# Patient Record
Sex: MALE | Race: WHITE | NOT HISPANIC OR LATINO | URBAN - METROPOLITAN AREA
[De-identification: names, ages, dates, MRNs, and addresses within clinical notes are randomized per-mention and may not be internally consistent; named-entity substitution may affect disease eponyms.]

---

## 2018-05-16 ENCOUNTER — OUTPATIENT (OUTPATIENT)
Dept: OUTPATIENT SERVICES | Facility: HOSPITAL | Age: 63
LOS: 1 days | Discharge: HOME | End: 2018-05-16
Payer: MEDICARE

## 2018-05-16 VITALS
OXYGEN SATURATION: 98 % | HEIGHT: 68 IN | RESPIRATION RATE: 17 BRPM | SYSTOLIC BLOOD PRESSURE: 165 MMHG | TEMPERATURE: 98 F | WEIGHT: 169.54 LBS | HEART RATE: 83 BPM | DIASTOLIC BLOOD PRESSURE: 74 MMHG

## 2018-05-16 DIAGNOSIS — Z01.818 ENCOUNTER FOR OTHER PREPROCEDURAL EXAMINATION: ICD-10-CM

## 2018-05-16 DIAGNOSIS — E78.5 HYPERLIPIDEMIA, UNSPECIFIED: ICD-10-CM

## 2018-05-16 DIAGNOSIS — M17.0 BILATERAL PRIMARY OSTEOARTHRITIS OF KNEE: ICD-10-CM

## 2018-05-16 DIAGNOSIS — I10 ESSENTIAL (PRIMARY) HYPERTENSION: ICD-10-CM

## 2018-05-16 DIAGNOSIS — Z90.49 ACQUIRED ABSENCE OF OTHER SPECIFIED PARTS OF DIGESTIVE TRACT: Chronic | ICD-10-CM

## 2018-05-16 DIAGNOSIS — Z98.890 OTHER SPECIFIED POSTPROCEDURAL STATES: Chronic | ICD-10-CM

## 2018-05-16 DIAGNOSIS — M25.569 PAIN IN UNSPECIFIED KNEE: ICD-10-CM

## 2018-05-16 LAB
ANION GAP SERPL CALC-SCNC: 8 MMOL/L — SIGNIFICANT CHANGE UP (ref 5–17)
APTT BLD: 32 SEC — SIGNIFICANT CHANGE UP (ref 27.5–37.4)
BASOPHILS # BLD AUTO: 0.03 K/UL — SIGNIFICANT CHANGE UP (ref 0–0.2)
BASOPHILS NFR BLD AUTO: 0.5 % — SIGNIFICANT CHANGE UP (ref 0–2)
BUN SERPL-MCNC: 18 MG/DL — SIGNIFICANT CHANGE UP (ref 7–23)
CALCIUM SERPL-MCNC: 9.6 MG/DL — SIGNIFICANT CHANGE UP (ref 8.5–10.1)
CHLORIDE SERPL-SCNC: 105 MMOL/L — SIGNIFICANT CHANGE UP (ref 96–108)
CO2 SERPL-SCNC: 28 MMOL/L — SIGNIFICANT CHANGE UP (ref 22–31)
CREAT SERPL-MCNC: 1.19 MG/DL — SIGNIFICANT CHANGE UP (ref 0.5–1.3)
EOSINOPHIL # BLD AUTO: 0.48 K/UL — SIGNIFICANT CHANGE UP (ref 0–0.5)
EOSINOPHIL NFR BLD AUTO: 8.4 % — HIGH (ref 0–6)
GLUCOSE SERPL-MCNC: 142 MG/DL — HIGH (ref 70–99)
HBA1C BLD-MCNC: 5.7 % — HIGH (ref 4–5.6)
HCT VFR BLD CALC: 43.2 % — SIGNIFICANT CHANGE UP (ref 39–50)
HGB BLD-MCNC: 15.3 G/DL — SIGNIFICANT CHANGE UP (ref 13–17)
IMM GRANULOCYTES NFR BLD AUTO: 0.5 % — SIGNIFICANT CHANGE UP (ref 0–1.5)
INR BLD: 1.08 RATIO — SIGNIFICANT CHANGE UP (ref 0.88–1.16)
LYMPHOCYTES # BLD AUTO: 1.5 K/UL — SIGNIFICANT CHANGE UP (ref 1–3.3)
LYMPHOCYTES # BLD AUTO: 26.2 % — SIGNIFICANT CHANGE UP (ref 13–44)
MCHC RBC-ENTMCNC: 30.1 PG — SIGNIFICANT CHANGE UP (ref 27–34)
MCHC RBC-ENTMCNC: 35.4 GM/DL — SIGNIFICANT CHANGE UP (ref 32–36)
MCV RBC AUTO: 85 FL — SIGNIFICANT CHANGE UP (ref 80–100)
MONOCYTES # BLD AUTO: 0.58 K/UL — SIGNIFICANT CHANGE UP (ref 0–0.9)
MONOCYTES NFR BLD AUTO: 10.1 % — SIGNIFICANT CHANGE UP (ref 2–14)
MRSA PCR RESULT.: SIGNIFICANT CHANGE UP
NEUTROPHILS # BLD AUTO: 3.1 K/UL — SIGNIFICANT CHANGE UP (ref 1.8–7.4)
NEUTROPHILS NFR BLD AUTO: 54.3 % — SIGNIFICANT CHANGE UP (ref 43–77)
PLATELET # BLD AUTO: 193 K/UL — SIGNIFICANT CHANGE UP (ref 150–400)
POTASSIUM SERPL-MCNC: 3.6 MMOL/L — SIGNIFICANT CHANGE UP (ref 3.5–5.3)
POTASSIUM SERPL-SCNC: 3.6 MMOL/L — SIGNIFICANT CHANGE UP (ref 3.5–5.3)
PROTHROM AB SERPL-ACNC: 11.8 SEC — SIGNIFICANT CHANGE UP (ref 9.8–12.7)
RBC # BLD: 5.08 M/UL — SIGNIFICANT CHANGE UP (ref 4.2–5.8)
RBC # FLD: 13.4 % — SIGNIFICANT CHANGE UP (ref 10.3–14.5)
S AUREUS DNA NOSE QL NAA+PROBE: SIGNIFICANT CHANGE UP
SODIUM SERPL-SCNC: 141 MMOL/L — SIGNIFICANT CHANGE UP (ref 135–145)
WBC # BLD: 5.72 K/UL — SIGNIFICANT CHANGE UP (ref 3.8–10.5)
WBC # FLD AUTO: 5.72 K/UL — SIGNIFICANT CHANGE UP (ref 3.8–10.5)

## 2018-05-16 PROCEDURE — 93010 ELECTROCARDIOGRAM REPORT: CPT | Mod: NC

## 2018-05-16 NOTE — PATIENT PROFILE ADULT. - PSH
History of liver biopsy    S/P colon resection  secondary to perforated diverticuli- hatman procedure and reversal

## 2018-05-16 NOTE — PHYSICAL THERAPY INITIAL EVALUATION ADULT - MODIFIED CLINICAL TEST OF SENSORY INTEGRATION IN BALANCE TEST
5x sit to stand = 16.46 seconds. 2MWT = 280ft without device. Stairs: step-over-step with R rail for ascent. Pt states with excessive pain he performs stair negotiation with a step-to-step pattern.

## 2018-05-16 NOTE — PATIENT PROFILE ADULT. - ABILITY TO HEAR (WITH HEARING AID OR HEARING APPLIANCE IF NORMALLY USED):
Bilateral Hearing Aids/Mildly to Moderately Impaired: difficulty hearing in some environments or speaker may need to increase volume or speak distinctly

## 2018-05-16 NOTE — OCCUPATIONAL THERAPY INITIAL EVALUATION ADULT - TRANSFER SAFETY CONCERNS NOTED: SIT/STAND, REHAB EVAL
decreased step length/decreased weight-shifting ability/decreased sequencing ability/decreased balance during turns

## 2018-05-16 NOTE — H&P PST ADULT - NSANTHOSAYNRD_GEN_A_CORE
No. WOJCIECH screening performed.  STOP BANG Legend: 0-2 = LOW Risk; 3-4 = INTERMEDIATE Risk; 5-8 = HIGH Risk

## 2018-05-16 NOTE — H&P PST ADULT - PSH
History of liver biopsy    S/P colon resection  secondary to perforated diverticuli- hatman procedure and reversal History of liver biopsy    S/P colon resection  secondary to perforated diverticuli- sanchez procedure and reversal

## 2018-05-16 NOTE — H&P PST ADULT - PMH
Fatty liver    HTN (hypertension)    Hyperlipidemia    Prostate CA  sees urologist  Pulmonary emboli  1996 post pelvic fracture

## 2018-05-16 NOTE — PHYSICAL THERAPY INITIAL EVALUATION ADULT - ADDITIONAL COMMENTS
Pt lives with his wife (whom can provide assistance after surgery) in a private home, 9 entry steps (B/L rails, close enough to reach), flight to 2nd level (6 steps with rail, landing, 6 additional steps with rail). Pt is independently with all functional mobility (including community ambulation without a device) & ADL's. Pt has a tub/shower combo, wears eye glasses for reading & distance, has hearing aid in R ear, is right hand dominant, drives, & is retired. Pt states he just recently participated in a triathalon & would like to compete again. In addition, pt states he has a trip booked to [place] 3 weeks post-op & he would like to be able to go. Pt scheduled to have R TKA on 7/11. Pt states he has 1/10 knee pain at rest & it increases with activity. Pt takes Aleve PRN. Pt has no DMEs at home.

## 2018-05-16 NOTE — OCCUPATIONAL THERAPY INITIAL EVALUATION ADULT - ADDITIONAL COMMENTS
Patient lives in a private house with 9 steps to enter with bilateral handrails. Once inside, the patient has  12 steps with a rail on one side to reach main floor where bedroom and bathroom is. The patients bathroom has a tub/shower combination with a regular toilet. The patient reports not having any DME inside of the bathroom. The patient ambulates with no device and does not own any devices for ambulation.

## 2018-05-29 ENCOUNTER — TRANSCRIPTION ENCOUNTER (OUTPATIENT)
Age: 63
End: 2018-05-29

## 2018-05-29 RX ORDER — SODIUM CHLORIDE 9 MG/ML
3 INJECTION INTRAMUSCULAR; INTRAVENOUS; SUBCUTANEOUS EVERY 8 HOURS
Qty: 0 | Refills: 0 | Status: DISCONTINUED | OUTPATIENT
Start: 2018-05-30 | End: 2018-05-31

## 2018-05-30 ENCOUNTER — RESULT REVIEW (OUTPATIENT)
Age: 63
End: 2018-05-30

## 2018-05-30 ENCOUNTER — TRANSCRIPTION ENCOUNTER (OUTPATIENT)
Age: 63
End: 2018-05-30

## 2018-05-30 ENCOUNTER — INPATIENT (INPATIENT)
Facility: HOSPITAL | Age: 63
LOS: 0 days | Discharge: HOME HEALTH | End: 2018-05-31
Attending: ORTHOPAEDIC SURGERY | Admitting: ORTHOPAEDIC SURGERY
Payer: MEDICARE

## 2018-05-30 VITALS
RESPIRATION RATE: 16 BRPM | DIASTOLIC BLOOD PRESSURE: 69 MMHG | OXYGEN SATURATION: 97 % | SYSTOLIC BLOOD PRESSURE: 150 MMHG | TEMPERATURE: 98 F | HEIGHT: 68 IN | HEART RATE: 74 BPM | WEIGHT: 175.93 LBS

## 2018-05-30 DIAGNOSIS — R73.03 PREDIABETES: ICD-10-CM

## 2018-05-30 DIAGNOSIS — E78.2 MIXED HYPERLIPIDEMIA: ICD-10-CM

## 2018-05-30 DIAGNOSIS — Z90.49 ACQUIRED ABSENCE OF OTHER SPECIFIED PARTS OF DIGESTIVE TRACT: Chronic | ICD-10-CM

## 2018-05-30 DIAGNOSIS — Z98.890 OTHER SPECIFIED POSTPROCEDURAL STATES: Chronic | ICD-10-CM

## 2018-05-30 DIAGNOSIS — C61 MALIGNANT NEOPLASM OF PROSTATE: ICD-10-CM

## 2018-05-30 DIAGNOSIS — K21.0 GASTRO-ESOPHAGEAL REFLUX DISEASE WITH ESOPHAGITIS: ICD-10-CM

## 2018-05-30 DIAGNOSIS — M17.12 UNILATERAL PRIMARY OSTEOARTHRITIS, LEFT KNEE: ICD-10-CM

## 2018-05-30 DIAGNOSIS — I10 ESSENTIAL (PRIMARY) HYPERTENSION: ICD-10-CM

## 2018-05-30 LAB
HCT VFR BLD CALC: 37.6 % — LOW (ref 39–50)
HGB BLD-MCNC: 12.9 G/DL — LOW (ref 13–17)
MCHC RBC-ENTMCNC: 29.7 PG — SIGNIFICANT CHANGE UP (ref 27–34)
MCHC RBC-ENTMCNC: 34.3 GM/DL — SIGNIFICANT CHANGE UP (ref 32–36)
MCV RBC AUTO: 86.6 FL — SIGNIFICANT CHANGE UP (ref 80–100)
NRBC # BLD: 0 /100 WBCS — SIGNIFICANT CHANGE UP (ref 0–0)
PLATELET # BLD AUTO: 155 K/UL — SIGNIFICANT CHANGE UP (ref 150–400)
RBC # BLD: 4.34 M/UL — SIGNIFICANT CHANGE UP (ref 4.2–5.8)
RBC # FLD: 13.4 % — SIGNIFICANT CHANGE UP (ref 10.3–14.5)
WBC # BLD: 5.05 K/UL — SIGNIFICANT CHANGE UP (ref 3.8–10.5)
WBC # FLD AUTO: 5.05 K/UL — SIGNIFICANT CHANGE UP (ref 3.8–10.5)

## 2018-05-30 PROCEDURE — 88311 DECALCIFY TISSUE: CPT | Mod: 26

## 2018-05-30 PROCEDURE — 88305 TISSUE EXAM BY PATHOLOGIST: CPT | Mod: 26

## 2018-05-30 PROCEDURE — 99223 1ST HOSP IP/OBS HIGH 75: CPT

## 2018-05-30 PROCEDURE — 73560 X-RAY EXAM OF KNEE 1 OR 2: CPT | Mod: 26,LT

## 2018-05-30 RX ORDER — RIVAROXABAN 15 MG-20MG
10 KIT ORAL DAILY
Qty: 0 | Refills: 0 | Status: DISCONTINUED | OUTPATIENT
Start: 2018-05-31 | End: 2018-05-31

## 2018-05-30 RX ORDER — SODIUM CHLORIDE 9 MG/ML
1000 INJECTION INTRAMUSCULAR; INTRAVENOUS; SUBCUTANEOUS
Qty: 0 | Refills: 0 | Status: DISCONTINUED | OUTPATIENT
Start: 2018-05-30 | End: 2018-05-31

## 2018-05-30 RX ORDER — FOLIC ACID 0.8 MG
1 TABLET ORAL DAILY
Qty: 0 | Refills: 0 | Status: DISCONTINUED | OUTPATIENT
Start: 2018-05-30 | End: 2018-05-31

## 2018-05-30 RX ORDER — POLYETHYLENE GLYCOL 3350 17 G/17G
17 POWDER, FOR SOLUTION ORAL DAILY
Qty: 0 | Refills: 0 | Status: DISCONTINUED | OUTPATIENT
Start: 2018-05-30 | End: 2018-05-31

## 2018-05-30 RX ORDER — OXYCODONE HYDROCHLORIDE 5 MG/1
5 TABLET ORAL EVERY 4 HOURS
Qty: 0 | Refills: 0 | Status: DISCONTINUED | OUTPATIENT
Start: 2018-05-30 | End: 2018-05-31

## 2018-05-30 RX ORDER — ONDANSETRON 8 MG/1
4 TABLET, FILM COATED ORAL EVERY 6 HOURS
Qty: 0 | Refills: 0 | Status: DISCONTINUED | OUTPATIENT
Start: 2018-05-30 | End: 2018-05-31

## 2018-05-30 RX ORDER — CELECOXIB 200 MG/1
200 CAPSULE ORAL EVERY 12 HOURS
Qty: 0 | Refills: 0 | Status: DISCONTINUED | OUTPATIENT
Start: 2018-05-31 | End: 2018-05-31

## 2018-05-30 RX ORDER — MAGNESIUM HYDROXIDE 400 MG/1
30 TABLET, CHEWABLE ORAL DAILY
Qty: 0 | Refills: 0 | Status: DISCONTINUED | OUTPATIENT
Start: 2018-05-30 | End: 2018-05-31

## 2018-05-30 RX ORDER — HYDROMORPHONE HYDROCHLORIDE 2 MG/ML
1 INJECTION INTRAMUSCULAR; INTRAVENOUS; SUBCUTANEOUS
Qty: 0 | Refills: 0 | Status: DISCONTINUED | OUTPATIENT
Start: 2018-05-30 | End: 2018-05-30

## 2018-05-30 RX ORDER — HYDROCHLOROTHIAZIDE 25 MG
12.5 TABLET ORAL DAILY
Qty: 0 | Refills: 0 | Status: DISCONTINUED | OUTPATIENT
Start: 2018-05-30 | End: 2018-05-31

## 2018-05-30 RX ORDER — SENNA PLUS 8.6 MG/1
2 TABLET ORAL AT BEDTIME
Qty: 0 | Refills: 0 | Status: DISCONTINUED | OUTPATIENT
Start: 2018-05-30 | End: 2018-05-31

## 2018-05-30 RX ORDER — CELECOXIB 200 MG/1
200 CAPSULE ORAL ONCE
Qty: 0 | Refills: 0 | Status: COMPLETED | OUTPATIENT
Start: 2018-05-30 | End: 2018-05-30

## 2018-05-30 RX ORDER — DEXAMETHASONE 0.5 MG/5ML
10 ELIXIR ORAL ONCE
Qty: 0 | Refills: 0 | Status: COMPLETED | OUTPATIENT
Start: 2018-05-31 | End: 2018-05-31

## 2018-05-30 RX ORDER — OXYCODONE HYDROCHLORIDE 5 MG/1
10 TABLET ORAL EVERY 4 HOURS
Qty: 0 | Refills: 0 | Status: DISCONTINUED | OUTPATIENT
Start: 2018-05-30 | End: 2018-05-31

## 2018-05-30 RX ORDER — ACETAMINOPHEN 500 MG
650 TABLET ORAL ONCE
Qty: 0 | Refills: 0 | Status: COMPLETED | OUTPATIENT
Start: 2018-05-30 | End: 2018-05-30

## 2018-05-30 RX ORDER — OXYCODONE HYDROCHLORIDE 5 MG/1
20 TABLET ORAL ONCE
Qty: 0 | Refills: 0 | Status: DISCONTINUED | OUTPATIENT
Start: 2018-05-30 | End: 2018-05-30

## 2018-05-30 RX ORDER — ASCORBIC ACID 60 MG
500 TABLET,CHEWABLE ORAL
Qty: 0 | Refills: 0 | Status: DISCONTINUED | OUTPATIENT
Start: 2018-05-30 | End: 2018-05-31

## 2018-05-30 RX ORDER — FENTANYL CITRATE 50 UG/ML
50 INJECTION INTRAVENOUS
Qty: 0 | Refills: 0 | Status: DISCONTINUED | OUTPATIENT
Start: 2018-05-30 | End: 2018-05-30

## 2018-05-30 RX ORDER — DOCUSATE SODIUM 100 MG
100 CAPSULE ORAL THREE TIMES A DAY
Qty: 0 | Refills: 0 | Status: DISCONTINUED | OUTPATIENT
Start: 2018-05-30 | End: 2018-05-31

## 2018-05-30 RX ORDER — GEMFIBROZIL 600 MG
600 TABLET ORAL
Qty: 0 | Refills: 0 | Status: DISCONTINUED | OUTPATIENT
Start: 2018-05-30 | End: 2018-05-31

## 2018-05-30 RX ORDER — TRANEXAMIC ACID 100 MG/ML
1000 INJECTION, SOLUTION INTRAVENOUS ONCE
Qty: 0 | Refills: 0 | Status: COMPLETED | OUTPATIENT
Start: 2018-05-30 | End: 2018-05-30

## 2018-05-30 RX ORDER — CEFAZOLIN SODIUM 1 G
2000 VIAL (EA) INJECTION EVERY 8 HOURS
Qty: 0 | Refills: 0 | Status: COMPLETED | OUTPATIENT
Start: 2018-05-30 | End: 2018-05-31

## 2018-05-30 RX ORDER — PANTOPRAZOLE SODIUM 20 MG/1
40 TABLET, DELAYED RELEASE ORAL DAILY
Qty: 0 | Refills: 0 | Status: DISCONTINUED | OUTPATIENT
Start: 2018-05-30 | End: 2018-05-31

## 2018-05-30 RX ORDER — HYDROMORPHONE HYDROCHLORIDE 2 MG/ML
1 INJECTION INTRAMUSCULAR; INTRAVENOUS; SUBCUTANEOUS EVERY 8 HOURS
Qty: 0 | Refills: 0 | Status: DISCONTINUED | OUTPATIENT
Start: 2018-05-30 | End: 2018-05-31

## 2018-05-30 RX ORDER — SODIUM CHLORIDE 9 MG/ML
1000 INJECTION, SOLUTION INTRAVENOUS
Qty: 0 | Refills: 0 | Status: DISCONTINUED | OUTPATIENT
Start: 2018-05-30 | End: 2018-05-30

## 2018-05-30 RX ORDER — ACETAMINOPHEN 500 MG
975 TABLET ORAL EVERY 8 HOURS
Qty: 0 | Refills: 0 | Status: DISCONTINUED | OUTPATIENT
Start: 2018-05-30 | End: 2018-05-31

## 2018-05-30 RX ADMIN — SODIUM CHLORIDE 3 MILLILITER(S): 9 INJECTION INTRAMUSCULAR; INTRAVENOUS; SUBCUTANEOUS at 21:31

## 2018-05-30 RX ADMIN — OXYCODONE HYDROCHLORIDE 10 MILLIGRAM(S): 5 TABLET ORAL at 20:51

## 2018-05-30 RX ADMIN — OXYCODONE HYDROCHLORIDE 5 MILLIGRAM(S): 5 TABLET ORAL at 14:38

## 2018-05-30 RX ADMIN — Medication 100 MILLIGRAM(S): at 14:38

## 2018-05-30 RX ADMIN — Medication 100 MILLIGRAM(S): at 16:21

## 2018-05-30 RX ADMIN — TRANEXAMIC ACID 220 MILLIGRAM(S): 100 INJECTION, SOLUTION INTRAVENOUS at 11:24

## 2018-05-30 RX ADMIN — OXYCODONE HYDROCHLORIDE 5 MILLIGRAM(S): 5 TABLET ORAL at 18:21

## 2018-05-30 RX ADMIN — OXYCODONE HYDROCHLORIDE 20 MILLIGRAM(S): 5 TABLET ORAL at 07:31

## 2018-05-30 RX ADMIN — Medication 650 MILLIGRAM(S): at 07:31

## 2018-05-30 RX ADMIN — OXYCODONE HYDROCHLORIDE 5 MILLIGRAM(S): 5 TABLET ORAL at 15:38

## 2018-05-30 RX ADMIN — OXYCODONE HYDROCHLORIDE 10 MILLIGRAM(S): 5 TABLET ORAL at 21:51

## 2018-05-30 RX ADMIN — SODIUM CHLORIDE 3 MILLILITER(S): 9 INJECTION INTRAMUSCULAR; INTRAVENOUS; SUBCUTANEOUS at 14:35

## 2018-05-30 RX ADMIN — SODIUM CHLORIDE 100 MILLILITER(S): 9 INJECTION, SOLUTION INTRAVENOUS at 11:24

## 2018-05-30 RX ADMIN — Medication 500 MILLIGRAM(S): at 18:21

## 2018-05-30 RX ADMIN — Medication 975 MILLIGRAM(S): at 14:38

## 2018-05-30 RX ADMIN — OXYCODONE HYDROCHLORIDE 5 MILLIGRAM(S): 5 TABLET ORAL at 21:31

## 2018-05-30 RX ADMIN — OXYCODONE HYDROCHLORIDE 5 MILLIGRAM(S): 5 TABLET ORAL at 22:31

## 2018-05-30 RX ADMIN — CELECOXIB 200 MILLIGRAM(S): 200 CAPSULE ORAL at 07:31

## 2018-05-30 RX ADMIN — OXYCODONE HYDROCHLORIDE 5 MILLIGRAM(S): 5 TABLET ORAL at 19:21

## 2018-05-30 RX ADMIN — Medication 975 MILLIGRAM(S): at 21:31

## 2018-05-30 NOTE — PROGRESS NOTE ADULT - SUBJECTIVE AND OBJECTIVE BOX
Post-op Check   POD#0 s/p Left TKA   63yMale Patient seen and examined, Pain controlled  Patient Denies SOB, CP, N/V/D       PE: Left Knee/LE: Dressing C/D/I, Sensation/motor intact, DP 2+, FROM ankle/toes   B/L LE: Skin intact. +ROM hip/knee/ankle/toes. Ankle Dorsi/plantarflexion: 5/5. Calf: soft, compressible and nontender. DP/PT 2+ NVI.                               12.9   5.05  )-----------( 155      ( 30 May 2018 11:00 )             37.6         A: As above   P: Pain Control       DVT Prophylaxis      Incentive spirometry      PT WBAT LLE      Isometric exercises      Discharge Planning      All the above discussed and understood by pt       Ortho to F/U

## 2018-05-30 NOTE — DISCHARGE NOTE ADULT - CARE PROVIDER_API CALL
Juan M Cardenas), Orthopaedic Surgery  [address] [place] [address]  Phone: [phone]  Fax: [phone]

## 2018-05-30 NOTE — CONSULT NOTE ADULT - PROBLEM SELECTOR RECOMMENDATION 9
cont with pain management and bowel regimen  monitor h/h  dvt ppx as per ortho  OT/PT  incentive spirometer  zofran prn for nausea

## 2018-05-30 NOTE — DISCHARGE NOTE ADULT - ABILITY TO HEAR (WITH HEARING AID OR HEARING APPLIANCE IF NORMALLY USED):
Mildly to Moderately Impaired: difficulty hearing in some environments or speaker may need to increase volume or speak distinctly/Bilateral Hearing Aids-left @ home.

## 2018-05-30 NOTE — DISCHARGE NOTE ADULT - HOME CARE AGENCY
RN from [facility], [phone], will visit your home the day after your discharge to begin supportive home care services.  Physical therapy and Occupational therapy evaluation will be provided.

## 2018-05-30 NOTE — CONSULT NOTE ADULT - SUBJECTIVE AND OBJECTIVE BOX
HPI: 64 yo M with h/o fatty liver, HTN HLD , prediabetes,prostate ca (conservative monitoring, no treatment at this time), h/o PE s/p pelvic fracture in 1996, OA s/p left TKA. Pt with c/o some left knee pain 3/5, no n/v, no cp or sob      PAST MEDICAL & SURGICAL HISTORY:  Fatty liver  Pulmonary emboli: 1996 post pelvic fracture  Prostate CA: sees urologist  Hyperlipidemia  HTN (hypertension)  S/P hernia repair: Bilateral with Mesh.  History of liver biopsy  S/P colon resection: secondary to perforated diverticuli- sanchez procedure and reversal      REVIEW OF SYSTEMS:    CONSTITUTIONAL: No fever, weight loss, + fatigue  EYES: No eye pain, visual disturbances, or discharge  ENMT:  No difficulty hearing, tinnitus, vertigo; No sinus or throat pain  RESPIRATORY: No cough, wheezing, chills or hemoptysis; No shortness of breath  CARDIOVASCULAR: No chest pain, palpitations, dizziness, or leg swelling  GASTROINTESTINAL: No abdominal or epigastric pain. No nausea, vomiting, or hematemesis; No diarrhea or constipation. No melena or hematochezia.  GENITOURINARY: No dysuria, frequency, hematuria, or incontinence  NEUROLOGICAL: No headaches, memory loss, loss of strength, numbness, or tremorss  MUSCULOSKELETAL: No joint pain or swelling; mild knee pain        MEDICATIONS  (STANDING):  acetaminophen   Tablet 975 milliGRAM(s) Oral every 8 hours  ascorbic acid 500 milliGRAM(s) Oral two times a day  BUpivacaine liposome 1.3% injectable (Rx Quick Charge) 20 milliLiter(s) Local Injection   ceFAZolin   IVPB 2000 milliGRAM(s) IV Intermittent every 8 hours  docusate sodium 100 milliGRAM(s) Oral three times a day  folic acid 1 milliGRAM(s) Oral daily  multivitamin 1 Tablet(s) Oral daily  oxyCODONE    IR 5 milliGRAM(s) Oral every 4 hours  pantoprazole    Tablet 40 milliGRAM(s) Oral daily  polyethylene glycol 3350 17 Gram(s) Oral daily  sodium chloride 0.9% lock flush 3 milliLiter(s) IV Push every 8 hours  sodium chloride 0.9%. 1000 milliLiter(s) (120 mL/Hr) IV Continuous <Continuous>    MEDICATIONS  (PRN):  aluminum hydroxide/magnesium hydroxide/simethicone Suspension 30 milliLiter(s) Oral four times a day PRN Indigestion  HYDROmorphone  Injectable 1 milliGRAM(s) IV Push every 8 hours PRN breakthrough  magnesium hydroxide Suspension 30 milliLiter(s) Oral daily PRN Constipation  ondansetron Injectable 4 milliGRAM(s) IV Push every 6 hours PRN Nausea and/or Vomiting  oxyCODONE    IR 5 milliGRAM(s) Oral every 4 hours PRN pain 1 - 5  oxyCODONE    IR 10 milliGRAM(s) Oral every 4 hours PRN Pain 6 - 10  senna 2 Tablet(s) Oral at bedtime PRN Constipation      Allergies    No Known Allergies    Intolerances        SOCIAL HISTORY:  retired, , lives with spouse, no EtOH, no smoking    FAMILY HISTORY:  non-contributory      Vital Signs Last 24 Hrs  T(C): 35.6 (30 May 2018 13:26), Max: 36.7 (30 May 2018 11:50)  T(F): 96.1 (30 May 2018 13:26), Max: 98.1 (30 May 2018 11:50)  HR: 101 (30 May 2018 14:00) (74 - 101)  BP: 149/87 (30 May 2018 14:00) (117/75 - 165/83)  BP(mean): --  RR: 17 (30 May 2018 13:35) (10 - 17)  SpO2: 98% (30 May 2018 14:00) (94% - 98%)    PHYSICAL EXAM:    GENERAL: NAD, well-groomed, well-developed  HEAD:  Atraumatic, Normocephalic  EYES: EOMI, PERRLA, conjunctiva and sclera clear  ENMT: No tonsillar erythema, exudates, or enlargement; Moist mucous membranes  NECK: Supple, No JVD, Normal thyroid  NERVOUS SYSTEM:  Alert & Oriented X3, Good concentration; Motor Strength 5/5 B/L upper and lower extremities; DTRs 2+ intact and symmetric  CHEST/LUNG: Clear to percussion bilaterally; No rales, rhonchi, wheezing, or rubs  HEART: Regular rate and rhythm; No murmurs, rubs, or gallops  ABDOMEN: Soft, Nontender, Nondistended; Bowel sounds present  EXTREMITIES:  2+ Peripheral Pulses, left knee dressing c/d/i  SKIN: No rashes or lesions      LABS:                        12.9   5.05  )-----------( 155      ( 30 May 2018 11:00 )             37.6                 RADIOLOGY & ADDITIONAL STUDIES:

## 2018-05-30 NOTE — DISCHARGE NOTE ADULT - PATIENT PORTAL LINK FT
You can access the [facility] Patient Portal, offered by [facility], by registering with the following website: [email or web address]

## 2018-05-30 NOTE — DISCHARGE NOTE ADULT - NS AS ACTIVITY OBS
No Heavy lifting/straining/Showering allowed/Walking-Indoors allowed/Do not drive or operate machinery/Stairs allowed/Walking-Outdoors allowed

## 2018-05-30 NOTE — PHYSICAL THERAPY INITIAL EVALUATION ADULT - GAIT DEVIATIONS NOTED, PT EVAL
decreased stride length/decreased step length/decreased lam/increased time in double stance/decreased weight-shifting ability

## 2018-05-30 NOTE — OCCUPATIONAL THERAPY INITIAL EVALUATION ADULT - ADDITIONAL COMMENTS
As per pre surgical testing: Patient lives in a private house with 9 steps to enter with bilateral handrails. Once inside, the patient has  12 steps with a rail on one side to reach main floor where bedroom and bathroom is. The patients bathroom has a tub/shower combination with a regular toilet. The patient reports not having any DME inside of the bathroom. The patient ambulates with no device and does not own any devices for ambulation. Wife will provide support.     Pt performed lower body dressing (donning/doffing socks, donning undergarments/shorts, and donning shoes) with rolling walker with supervision, chair to bed transfer with rolling walker with supervision and bed to chair transfer with rolling walker with supervision.

## 2018-05-30 NOTE — CONSULT NOTE ADULT - ASSESSMENT
62 yo M with h/o fatty liver, HTN HLD , prediabetes,prostate ca (conservative monitoring, no treatment at this time), h/o PE s/p pelvic fracture in 1996, OA s/p left TKA.

## 2018-05-30 NOTE — DISCHARGE NOTE ADULT - MEDICATION SUMMARY - MEDICATIONS TO STOP TAKING
I will STOP taking the medications listed below when I get home from the hospital:    aspirin 81 mg oral tablet  -- 1 tab(s) by mouth once a day    Aleve  -- 1 tab(s) by mouth once a day, As Needed

## 2018-05-30 NOTE — BRIEF OPERATIVE NOTE - PROCEDURE
<<-----Click on this checkbox to enter Procedure Left total knee arthroplasty  05/30/2018    Active  [id number]

## 2018-05-30 NOTE — DISCHARGE NOTE ADULT - HOSPITAL COURSE
63yMale with history of Left Knee Pain presenting for Left TKA by Dr. Cardenas on 5/30/18. Risk and benefits of surgery were explained to the patient. The patient understood and agreed to proceed with surgery. Patient underwent the procedure with no intraoperative complications. Pt was brought in stable condition to the PACU. Once stable in PACU, pt was brought to the floor. During hospital stay pt was followed by Medicine, [social work or home care] during this admission. Pt had an uneventful hospital course. Pt is stable for discharge to Home with Home Care Services and PT

## 2018-05-30 NOTE — PATIENT PROFILE ADULT. - PSH
History of liver biopsy    S/P colon resection  secondary to perforated diverticuli- sanchez procedure and reversal History of liver biopsy    S/P colon resection  secondary to perforated diverticuli- sanchez procedure and reversal  S/P hernia repair  Bilateral with Mesh.

## 2018-05-30 NOTE — PATIENT PROFILE ADULT. - ABILITY TO HEAR (WITH HEARING AID OR HEARING APPLIANCE IF NORMALLY USED):
Bilateral Hearing Aids-left @ home./Mildly to Moderately Impaired: difficulty hearing in some environments or speaker may need to increase volume or speak distinctly

## 2018-05-31 VITALS
DIASTOLIC BLOOD PRESSURE: 78 MMHG | SYSTOLIC BLOOD PRESSURE: 141 MMHG | TEMPERATURE: 98 F | HEART RATE: 86 BPM | OXYGEN SATURATION: 98 % | RESPIRATION RATE: 16 BRPM

## 2018-05-31 LAB
ANION GAP SERPL CALC-SCNC: 9 MMOL/L — SIGNIFICANT CHANGE UP (ref 5–17)
BUN SERPL-MCNC: 20 MG/DL — SIGNIFICANT CHANGE UP (ref 7–23)
CALCIUM SERPL-MCNC: 8.5 MG/DL — SIGNIFICANT CHANGE UP (ref 8.5–10.1)
CHLORIDE SERPL-SCNC: 104 MMOL/L — SIGNIFICANT CHANGE UP (ref 96–108)
CO2 SERPL-SCNC: 28 MMOL/L — SIGNIFICANT CHANGE UP (ref 22–31)
CREAT SERPL-MCNC: 1.18 MG/DL — SIGNIFICANT CHANGE UP (ref 0.5–1.3)
GLUCOSE SERPL-MCNC: 113 MG/DL — HIGH (ref 70–99)
HCT VFR BLD CALC: 39.8 % — SIGNIFICANT CHANGE UP (ref 39–50)
HGB BLD-MCNC: 13.5 G/DL — SIGNIFICANT CHANGE UP (ref 13–17)
MCHC RBC-ENTMCNC: 29.5 PG — SIGNIFICANT CHANGE UP (ref 27–34)
MCHC RBC-ENTMCNC: 33.9 GM/DL — SIGNIFICANT CHANGE UP (ref 32–36)
MCV RBC AUTO: 86.9 FL — SIGNIFICANT CHANGE UP (ref 80–100)
NRBC # BLD: 0 /100 WBCS — SIGNIFICANT CHANGE UP (ref 0–0)
PLATELET # BLD AUTO: 198 K/UL — SIGNIFICANT CHANGE UP (ref 150–400)
POTASSIUM SERPL-MCNC: 3.4 MMOL/L — LOW (ref 3.5–5.3)
POTASSIUM SERPL-SCNC: 3.4 MMOL/L — LOW (ref 3.5–5.3)
RBC # BLD: 4.58 M/UL — SIGNIFICANT CHANGE UP (ref 4.2–5.8)
RBC # FLD: 13.4 % — SIGNIFICANT CHANGE UP (ref 10.3–14.5)
SODIUM SERPL-SCNC: 141 MMOL/L — SIGNIFICANT CHANGE UP (ref 135–145)
WBC # BLD: 14.73 K/UL — HIGH (ref 3.8–10.5)
WBC # FLD AUTO: 14.73 K/UL — HIGH (ref 3.8–10.5)

## 2018-05-31 RX ORDER — OXYCODONE HYDROCHLORIDE 5 MG/1
1 TABLET ORAL
Qty: 0 | Refills: 0 | COMMUNITY
Start: 2018-05-31 | End: 2018-06-06

## 2018-05-31 RX ORDER — RIVAROXABAN 15 MG-20MG
1 KIT ORAL
Qty: 34 | Refills: 0 | OUTPATIENT
Start: 2018-05-31 | End: 2018-07-03

## 2018-05-31 RX ORDER — DOCUSATE SODIUM 100 MG
1 CAPSULE ORAL
Qty: 0 | Refills: 0 | COMMUNITY
Start: 2018-05-31

## 2018-05-31 RX ORDER — OXYCODONE HYDROCHLORIDE 5 MG/1
1 TABLET ORAL
Qty: 42 | Refills: 0 | OUTPATIENT
Start: 2018-05-31 | End: 2018-06-06

## 2018-05-31 RX ORDER — ASCORBIC ACID 60 MG
1 TABLET,CHEWABLE ORAL
Qty: 0 | Refills: 0 | COMMUNITY
Start: 2018-05-31

## 2018-05-31 RX ORDER — CELECOXIB 200 MG/1
1 CAPSULE ORAL
Qty: 60 | Refills: 0 | OUTPATIENT
Start: 2018-05-31 | End: 2018-06-29

## 2018-05-31 RX ORDER — KETOROLAC TROMETHAMINE 30 MG/ML
30 SYRINGE (ML) INJECTION ONCE
Qty: 0 | Refills: 0 | Status: DISCONTINUED | OUTPATIENT
Start: 2018-05-31 | End: 2018-05-31

## 2018-05-31 RX ORDER — ASPIRIN/CALCIUM CARB/MAGNESIUM 324 MG
1 TABLET ORAL
Qty: 0 | Refills: 0 | COMMUNITY

## 2018-05-31 RX ORDER — ACETAMINOPHEN 500 MG
3 TABLET ORAL
Qty: 0 | Refills: 0 | COMMUNITY
Start: 2018-05-31

## 2018-05-31 RX ADMIN — RIVAROXABAN 10 MILLIGRAM(S): KIT at 12:30

## 2018-05-31 RX ADMIN — OXYCODONE HYDROCHLORIDE 5 MILLIGRAM(S): 5 TABLET ORAL at 05:38

## 2018-05-31 RX ADMIN — Medication 100 MILLIGRAM(S): at 05:38

## 2018-05-31 RX ADMIN — Medication 12.5 MILLIGRAM(S): at 05:37

## 2018-05-31 RX ADMIN — OXYCODONE HYDROCHLORIDE 10 MILLIGRAM(S): 5 TABLET ORAL at 02:11

## 2018-05-31 RX ADMIN — PANTOPRAZOLE SODIUM 40 MILLIGRAM(S): 20 TABLET, DELAYED RELEASE ORAL at 12:30

## 2018-05-31 RX ADMIN — OXYCODONE HYDROCHLORIDE 10 MILLIGRAM(S): 5 TABLET ORAL at 01:11

## 2018-05-31 RX ADMIN — CELECOXIB 200 MILLIGRAM(S): 200 CAPSULE ORAL at 06:38

## 2018-05-31 RX ADMIN — Medication 975 MILLIGRAM(S): at 05:38

## 2018-05-31 RX ADMIN — Medication 100 MILLIGRAM(S): at 00:20

## 2018-05-31 RX ADMIN — Medication 600 MILLIGRAM(S): at 05:38

## 2018-05-31 RX ADMIN — OXYCODONE HYDROCHLORIDE 5 MILLIGRAM(S): 5 TABLET ORAL at 03:21

## 2018-05-31 RX ADMIN — Medication 1 MILLIGRAM(S): at 12:30

## 2018-05-31 RX ADMIN — Medication 30 MILLIGRAM(S): at 08:31

## 2018-05-31 RX ADMIN — Medication 102 MILLIGRAM(S): at 05:37

## 2018-05-31 RX ADMIN — CELECOXIB 200 MILLIGRAM(S): 200 CAPSULE ORAL at 05:38

## 2018-05-31 RX ADMIN — Medication 30 MILLIGRAM(S): at 08:16

## 2018-05-31 RX ADMIN — Medication 500 MILLIGRAM(S): at 05:37

## 2018-05-31 RX ADMIN — OXYCODONE HYDROCHLORIDE 5 MILLIGRAM(S): 5 TABLET ORAL at 02:21

## 2018-05-31 RX ADMIN — OXYCODONE HYDROCHLORIDE 5 MILLIGRAM(S): 5 TABLET ORAL at 06:38

## 2018-05-31 RX ADMIN — POLYETHYLENE GLYCOL 3350 17 GRAM(S): 17 POWDER, FOR SOLUTION ORAL at 12:30

## 2018-05-31 RX ADMIN — Medication 1 TABLET(S): at 12:30

## 2018-05-31 RX ADMIN — SODIUM CHLORIDE 3 MILLILITER(S): 9 INJECTION INTRAMUSCULAR; INTRAVENOUS; SUBCUTANEOUS at 05:29

## 2018-05-31 NOTE — PROGRESS NOTE ADULT - SUBJECTIVE AND OBJECTIVE BOX
POD#1 s/p Left TKA   63yMale Patient seen and examined with Dr. Cardenas, Pain controlled  Patient Denies SOB, CP, N/V/D       PE: Left Knee/LE: Dressing C/D/I, Sensation/motor intact, DP 2+, FROM ankle/toes   B/L LE: Skin intact. +ROM hip/knee/ankle/toes. Ankle Dorsi/plantarflexion: 5/5. Calf: soft, compressible and nontender. DP/PT 2+ NVI.                           13.5   14.73 )-----------( 198      ( 31 May 2018 06:32 )             39.8       05-31    141  |  104  |  20  ----------------------------<  113<H>  3.4<L>   |  28  |  1.18    Ca    8.5      31 May 2018 06:32          A: As above   P: Pain Control       DVT Prophylaxis      Incentive spirometry      PT WBAT      Isometric exercises      Discharge Planning      All the above discussed and understood by pt       Ortho to F/U

## 2018-06-01 LAB — SURGICAL PATHOLOGY FINAL REPORT - CH: SIGNIFICANT CHANGE UP

## 2018-06-02 DIAGNOSIS — I10 ESSENTIAL (PRIMARY) HYPERTENSION: ICD-10-CM

## 2018-06-02 DIAGNOSIS — R73.03 PREDIABETES: ICD-10-CM

## 2018-06-02 DIAGNOSIS — K76.0 FATTY (CHANGE OF) LIVER, NOT ELSEWHERE CLASSIFIED: ICD-10-CM

## 2018-06-02 DIAGNOSIS — Z90.49 ACQUIRED ABSENCE OF OTHER SPECIFIED PARTS OF DIGESTIVE TRACT: ICD-10-CM

## 2018-06-02 DIAGNOSIS — C61 MALIGNANT NEOPLASM OF PROSTATE: ICD-10-CM

## 2018-06-02 DIAGNOSIS — M17.12 UNILATERAL PRIMARY OSTEOARTHRITIS, LEFT KNEE: ICD-10-CM

## 2018-06-02 DIAGNOSIS — Z86.711 PERSONAL HISTORY OF PULMONARY EMBOLISM: ICD-10-CM

## 2018-06-02 DIAGNOSIS — E78.5 HYPERLIPIDEMIA, UNSPECIFIED: ICD-10-CM

## 2018-06-27 ENCOUNTER — OUTPATIENT (OUTPATIENT)
Dept: OUTPATIENT SERVICES | Facility: HOSPITAL | Age: 63
LOS: 1 days | Discharge: HOME | End: 2018-06-27

## 2018-06-27 VITALS
DIASTOLIC BLOOD PRESSURE: 88 MMHG | OXYGEN SATURATION: 98 % | TEMPERATURE: 98 F | HEIGHT: 68 IN | HEART RATE: 100 BPM | SYSTOLIC BLOOD PRESSURE: 156 MMHG | RESPIRATION RATE: 18 BRPM | WEIGHT: 162.7 LBS

## 2018-06-27 DIAGNOSIS — M25.569 PAIN IN UNSPECIFIED KNEE: ICD-10-CM

## 2018-06-27 DIAGNOSIS — Z96.659 PRESENCE OF UNSPECIFIED ARTIFICIAL KNEE JOINT: Chronic | ICD-10-CM

## 2018-06-27 DIAGNOSIS — Z98.890 OTHER SPECIFIED POSTPROCEDURAL STATES: Chronic | ICD-10-CM

## 2018-06-27 DIAGNOSIS — Z90.89 ACQUIRED ABSENCE OF OTHER ORGANS: Chronic | ICD-10-CM

## 2018-06-27 DIAGNOSIS — M17.0 BILATERAL PRIMARY OSTEOARTHRITIS OF KNEE: ICD-10-CM

## 2018-06-27 DIAGNOSIS — I10 ESSENTIAL (PRIMARY) HYPERTENSION: ICD-10-CM

## 2018-06-27 DIAGNOSIS — Z90.49 ACQUIRED ABSENCE OF OTHER SPECIFIED PARTS OF DIGESTIVE TRACT: Chronic | ICD-10-CM

## 2018-06-27 DIAGNOSIS — Z01.818 ENCOUNTER FOR OTHER PREPROCEDURAL EXAMINATION: ICD-10-CM

## 2018-06-27 DIAGNOSIS — E78.5 HYPERLIPIDEMIA, UNSPECIFIED: ICD-10-CM

## 2018-06-27 LAB
ANION GAP SERPL CALC-SCNC: 7 MMOL/L — SIGNIFICANT CHANGE UP (ref 5–17)
APTT BLD: 38 SEC — HIGH (ref 27.5–37.4)
BASOPHILS # BLD AUTO: 0.02 K/UL — SIGNIFICANT CHANGE UP (ref 0–0.2)
BASOPHILS NFR BLD AUTO: 0.4 % — SIGNIFICANT CHANGE UP (ref 0–2)
BUN SERPL-MCNC: 26 MG/DL — HIGH (ref 7–23)
CALCIUM SERPL-MCNC: 9.8 MG/DL — SIGNIFICANT CHANGE UP (ref 8.5–10.1)
CHLORIDE SERPL-SCNC: 105 MMOL/L — SIGNIFICANT CHANGE UP (ref 96–108)
CO2 SERPL-SCNC: 31 MMOL/L — SIGNIFICANT CHANGE UP (ref 22–31)
CREAT SERPL-MCNC: 1.09 MG/DL — SIGNIFICANT CHANGE UP (ref 0.5–1.3)
EOSINOPHIL # BLD AUTO: 0.15 K/UL — SIGNIFICANT CHANGE UP (ref 0–0.5)
EOSINOPHIL NFR BLD AUTO: 2.7 % — SIGNIFICANT CHANGE UP (ref 0–6)
GLUCOSE SERPL-MCNC: 131 MG/DL — HIGH (ref 70–99)
HBA1C BLD-MCNC: 5.7 % — HIGH (ref 4–5.6)
HCT VFR BLD CALC: 42 % — SIGNIFICANT CHANGE UP (ref 39–50)
HGB BLD-MCNC: 14.2 G/DL — SIGNIFICANT CHANGE UP (ref 13–17)
IMM GRANULOCYTES NFR BLD AUTO: 0.5 % — SIGNIFICANT CHANGE UP (ref 0–1.5)
INR BLD: 1.33 RATIO — HIGH (ref 0.88–1.16)
LYMPHOCYTES # BLD AUTO: 1.37 K/UL — SIGNIFICANT CHANGE UP (ref 1–3.3)
LYMPHOCYTES # BLD AUTO: 24.2 % — SIGNIFICANT CHANGE UP (ref 13–44)
MCHC RBC-ENTMCNC: 29.5 PG — SIGNIFICANT CHANGE UP (ref 27–34)
MCHC RBC-ENTMCNC: 33.8 GM/DL — SIGNIFICANT CHANGE UP (ref 32–36)
MCV RBC AUTO: 87.3 FL — SIGNIFICANT CHANGE UP (ref 80–100)
MONOCYTES # BLD AUTO: 0.6 K/UL — SIGNIFICANT CHANGE UP (ref 0–0.9)
MONOCYTES NFR BLD AUTO: 10.6 % — SIGNIFICANT CHANGE UP (ref 2–14)
MRSA PCR RESULT.: SIGNIFICANT CHANGE UP
NEUTROPHILS # BLD AUTO: 3.48 K/UL — SIGNIFICANT CHANGE UP (ref 1.8–7.4)
NEUTROPHILS NFR BLD AUTO: 61.6 % — SIGNIFICANT CHANGE UP (ref 43–77)
PLATELET # BLD AUTO: 245 K/UL — SIGNIFICANT CHANGE UP (ref 150–400)
POTASSIUM SERPL-MCNC: 3.5 MMOL/L — SIGNIFICANT CHANGE UP (ref 3.5–5.3)
POTASSIUM SERPL-SCNC: 3.5 MMOL/L — SIGNIFICANT CHANGE UP (ref 3.5–5.3)
PROTHROM AB SERPL-ACNC: 14.6 SEC — HIGH (ref 9.8–12.7)
RBC # BLD: 4.81 M/UL — SIGNIFICANT CHANGE UP (ref 4.2–5.8)
RBC # FLD: 13.2 % — SIGNIFICANT CHANGE UP (ref 10.3–14.5)
S AUREUS DNA NOSE QL NAA+PROBE: SIGNIFICANT CHANGE UP
SODIUM SERPL-SCNC: 143 MMOL/L — SIGNIFICANT CHANGE UP (ref 135–145)
WBC # BLD: 5.65 K/UL — SIGNIFICANT CHANGE UP (ref 3.8–10.5)
WBC # FLD AUTO: 5.65 K/UL — SIGNIFICANT CHANGE UP (ref 3.8–10.5)

## 2018-06-27 NOTE — PATIENT PROFILE ADULT. - VISION (WITH CORRECTIVE LENSES IF THE PATIENT USUALLY WEARS THEM):
Partially impaired: cannot see medication labels or newsprint, but can see obstacles in path, and the surrounding layout; can count fingers at arm's length/Wear Glasses

## 2018-06-27 NOTE — H&P PST ADULT - PSH
History of liver biopsy    S/P colon resection  secondary to perforated diverticuli- sanchez procedure and reversal  S/P hernia repair  Bilateral with Mesh.  S/P knee replacement  left

## 2018-06-27 NOTE — PATIENT PROFILE ADULT. - PSH
History of liver biopsy    S/P colon resection  secondary to perforated diverticuli- sanchez procedure and reversal  S/P hernia repair  Bilateral with Mesh.  S/P knee replacement  Left ( 5/30/2018 )  S/P knee replacement  left  S/P tonsillectomy

## 2018-06-27 NOTE — PHYSICAL THERAPY INITIAL EVALUATION ADULT - ADDITIONAL COMMENTS
Pt has 9 steps c gregg rails, able to be reached simultaneously, to get into house. No need for stairs negotiation at home. Pt has a straight cane, rolling walker, and 3-1 commode at home from previous L total knee replacement on May 30th , 2018. Wife will be able to assist in ADL post op.

## 2018-06-27 NOTE — PHYSICAL THERAPY INITIAL EVALUATION ADULT - CRITERIA FOR SKILLED THERAPEUTIC INTERVENTIONS
rehab potential/functional limitations in following categories/impairments found/risk reduction/prevention/therapy frequency/anticipated discharge recommendation

## 2018-06-27 NOTE — PHYSICAL THERAPY INITIAL EVALUATION ADULT - IMPAIRMENTS FOUND, PT EVAL
aerobic capacity/endurance/muscle strength/posture/ergonomics and body mechanics/gait, locomotion, and balance

## 2018-06-27 NOTE — H&P PST ADULT - ASSESSMENT
osteoarthritis right knee  CAPRINI SCORE    AGE RELATED RISK FACTORS                                                       MOBILITY RELATED FACTORS  [ ] Age 41-60 years                                            (1 Point)                  [ ] Bed rest                                                        (1 Point)  [x ] Age: 61-74 years                                           (2 Points)                [ ] Plaster cast                                                   (2 Points)  [ ] Age= 75 years                                              (3 Points)                 [ ] Bed bound for more than 72 hours                   (2 Points)    DISEASE RELATED RISK FACTORS                                               GENDER SPECIFIC FACTORS  [ ] Edema in the lower extremities                       (1 Point)                  [ ] Pregnancy                                                     (1 Point)  [ ] Varicose veins                                               (1 Point)                  [ ] Post-partum < 6 weeks                                   (1 Point)             [x ] BMI > 25 Kg/m2                                            (1 Point)                  [ ] Hormonal therapy  or oral contraception            (1 Point)                 [ ] Sepsis (in the previous month)                        (1 Point)                  [ ] History of pregnancy complications  [ ] Pneumonia or serious lung disease                                               [ ] Unexplained or recurrent                       (1 Point)           (in the previous month)                               (1 Point)  [ ] Abnormal pulmonary function test                     (1 Point)                 SURGERY RELATED RISK FACTORS  [ ] Acute myocardial infarction                              (1 Point)                 [ ]  Section                                            (1 Point)  [ ] Congestive heart failure (in the previous month)  (1 Point)                 [ ] Minor surgery                                                 (1 Point)   [ ] Inflammatory bowel disease                             (1 Point)                 [ ] Arthroscopic surgery                                        (2 Points)  [ ] Central venous access                                    (2 Points)                [x ] General surgery lasting more than 45 minutes   (2 Points)       [ ] Stroke (in the previous month)                          (5 Points)               [ ] Elective arthroplasty                                        (5 Points)                                                                                                                                               HEMATOLOGY RELATED FACTORS                                                 TRAUMA RELATED RISK FACTORS  [ ] Prior episodes of VTE                                     (3 Points)                 [ ] Fracture of the hip, pelvis, or leg                       (5 Points)  [ ] Positive family history for VTE                         (3 Points)                 [ ] Acute spinal cord injury (in the previous month)  (5 Points)  [ ] Prothrombin 01312 A                                      (3 Points)                 [ ] Paralysis  (less than 1 month)                          (5 Points)  [ ] Factor V Leiden                                             (3 Points)                 [ ] Multiple Trauma within 1 month                         (5 Points)  [ ] Lupus anticoagulants                                     (3 Points)                                                           [ ] Anticardiolipin antibodies                                (3 Points)                                                       [ ] High homocysteine in the blood                      (3 Points)                                             [ ] Other congenital or acquired thrombophilia       (3 Points)                                                [ ] Heparin induced thrombocytopenia                  (3 Points)                                          Total Score [      8    ]

## 2018-06-27 NOTE — H&P PST ADULT - HISTORY OF PRESENT ILLNESS
62 yo male c/o right knee pain secondary to osteoarthritis - scheduled for right knee arhtroplasty  PMH- htn , PE on xarelto, HLD

## 2018-06-28 NOTE — OCCUPATIONAL THERAPY INITIAL EVALUATION ADULT - FINE MOTOR COORDINATION, FINE MOTOR COORDINATION TESTS, OT EVAL
Patient-specific activity scoring scheme (Point to one number): 0 -------5-------- 10 (0) =Unable to perform activity (10) -- Able to perform activity at the same level as before injury or problem. Activity: Movement ____6_____

## 2018-06-28 NOTE — OCCUPATIONAL THERAPY INITIAL EVALUATION ADULT - SOCIAL CONCERNS
As per patient "My wife will be home to help me with any of my daily tasks and needs when I get home"./None

## 2018-06-28 NOTE — OCCUPATIONAL THERAPY INITIAL EVALUATION ADULT - ADDITIONAL COMMENTS
Patient lives in a private house with 9 steps to enter with bilateral handrails on both sides which are too far apart. Once inside, the patient will be staying on the first floor which has a tub/shower combination with a regular toilet. The patient reports owning a commode, but does not use it. The patient ambulates with a cane for stability and owns a rolling walker. The patient is R handed, wears glasses all the time and drives.

## 2018-07-10 ENCOUNTER — TRANSCRIPTION ENCOUNTER (OUTPATIENT)
Age: 63
End: 2018-07-10

## 2018-07-10 RX ORDER — CELECOXIB 200 MG/1
200 CAPSULE ORAL EVERY 12 HOURS
Qty: 0 | Refills: 0 | Status: DISCONTINUED | OUTPATIENT
Start: 2018-07-12 | End: 2018-07-12

## 2018-07-10 RX ORDER — ONDANSETRON 8 MG/1
4 TABLET, FILM COATED ORAL EVERY 6 HOURS
Qty: 0 | Refills: 0 | Status: DISCONTINUED | OUTPATIENT
Start: 2018-07-11 | End: 2018-07-12

## 2018-07-10 RX ORDER — ASCORBIC ACID 60 MG
500 TABLET,CHEWABLE ORAL
Qty: 0 | Refills: 0 | Status: DISCONTINUED | OUTPATIENT
Start: 2018-07-11 | End: 2018-07-12

## 2018-07-10 RX ORDER — DOCUSATE SODIUM 100 MG
100 CAPSULE ORAL THREE TIMES A DAY
Qty: 0 | Refills: 0 | Status: DISCONTINUED | OUTPATIENT
Start: 2018-07-11 | End: 2018-07-12

## 2018-07-10 RX ORDER — MAGNESIUM HYDROXIDE 400 MG/1
30 TABLET, CHEWABLE ORAL DAILY
Qty: 0 | Refills: 0 | Status: DISCONTINUED | OUTPATIENT
Start: 2018-07-11 | End: 2018-07-12

## 2018-07-10 RX ORDER — PANTOPRAZOLE SODIUM 20 MG/1
40 TABLET, DELAYED RELEASE ORAL DAILY
Qty: 0 | Refills: 0 | Status: DISCONTINUED | OUTPATIENT
Start: 2018-07-11 | End: 2018-07-12

## 2018-07-10 RX ORDER — SENNA PLUS 8.6 MG/1
2 TABLET ORAL AT BEDTIME
Qty: 0 | Refills: 0 | Status: DISCONTINUED | OUTPATIENT
Start: 2018-07-11 | End: 2018-07-12

## 2018-07-10 RX ORDER — OXYCODONE HYDROCHLORIDE 5 MG/1
5 TABLET ORAL EVERY 4 HOURS
Qty: 0 | Refills: 0 | Status: DISCONTINUED | OUTPATIENT
Start: 2018-07-11 | End: 2018-07-12

## 2018-07-10 RX ORDER — VALSARTAN 80 MG/1
80 TABLET ORAL DAILY
Qty: 0 | Refills: 0 | Status: DISCONTINUED | OUTPATIENT
Start: 2018-07-11 | End: 2018-07-12

## 2018-07-10 RX ORDER — POLYETHYLENE GLYCOL 3350 17 G/17G
17 POWDER, FOR SOLUTION ORAL DAILY
Qty: 0 | Refills: 0 | Status: DISCONTINUED | OUTPATIENT
Start: 2018-07-11 | End: 2018-07-12

## 2018-07-10 RX ORDER — OXYCODONE HYDROCHLORIDE 5 MG/1
10 TABLET ORAL EVERY 4 HOURS
Qty: 0 | Refills: 0 | Status: DISCONTINUED | OUTPATIENT
Start: 2018-07-11 | End: 2018-07-12

## 2018-07-10 RX ORDER — FOLIC ACID 0.8 MG
1 TABLET ORAL DAILY
Qty: 0 | Refills: 0 | Status: DISCONTINUED | OUTPATIENT
Start: 2018-07-11 | End: 2018-07-12

## 2018-07-10 RX ORDER — ACETAMINOPHEN 500 MG
975 TABLET ORAL EVERY 8 HOURS
Qty: 0 | Refills: 0 | Status: DISCONTINUED | OUTPATIENT
Start: 2018-07-11 | End: 2018-07-12

## 2018-07-10 RX ORDER — SODIUM CHLORIDE 9 MG/ML
1000 INJECTION INTRAMUSCULAR; INTRAVENOUS; SUBCUTANEOUS
Qty: 0 | Refills: 0 | Status: DISCONTINUED | OUTPATIENT
Start: 2018-07-11 | End: 2018-07-12

## 2018-07-10 RX ORDER — RIVAROXABAN 15 MG-20MG
10 KIT ORAL DAILY
Qty: 0 | Refills: 0 | Status: DISCONTINUED | OUTPATIENT
Start: 2018-07-12 | End: 2018-07-12

## 2018-07-10 RX ORDER — DEXAMETHASONE 0.5 MG/5ML
10 ELIXIR ORAL ONCE
Qty: 0 | Refills: 0 | Status: COMPLETED | OUTPATIENT
Start: 2018-07-12 | End: 2018-07-12

## 2018-07-10 RX ORDER — HYDROCHLOROTHIAZIDE 25 MG
12.5 TABLET ORAL DAILY
Qty: 0 | Refills: 0 | Status: DISCONTINUED | OUTPATIENT
Start: 2018-07-11 | End: 2018-07-12

## 2018-07-10 RX ORDER — GEMFIBROZIL 600 MG
600 TABLET ORAL
Qty: 0 | Refills: 0 | Status: DISCONTINUED | OUTPATIENT
Start: 2018-07-11 | End: 2018-07-12

## 2018-07-10 RX ORDER — HYDROMORPHONE HYDROCHLORIDE 2 MG/ML
1 INJECTION INTRAMUSCULAR; INTRAVENOUS; SUBCUTANEOUS
Qty: 0 | Refills: 0 | Status: DISCONTINUED | OUTPATIENT
Start: 2018-07-11 | End: 2018-07-12

## 2018-07-11 ENCOUNTER — TRANSCRIPTION ENCOUNTER (OUTPATIENT)
Age: 63
End: 2018-07-11

## 2018-07-11 ENCOUNTER — INPATIENT (INPATIENT)
Facility: HOSPITAL | Age: 63
LOS: 0 days | Discharge: HOME HEALTH | End: 2018-07-12
Attending: ORTHOPAEDIC SURGERY | Admitting: ORTHOPAEDIC SURGERY
Payer: MEDICARE

## 2018-07-11 ENCOUNTER — RESULT REVIEW (OUTPATIENT)
Age: 63
End: 2018-07-11

## 2018-07-11 VITALS
OXYGEN SATURATION: 97 % | SYSTOLIC BLOOD PRESSURE: 148 MMHG | WEIGHT: 162.04 LBS | TEMPERATURE: 98 F | HEIGHT: 68 IN | RESPIRATION RATE: 16 BRPM | HEART RATE: 100 BPM | DIASTOLIC BLOOD PRESSURE: 79 MMHG

## 2018-07-11 DIAGNOSIS — Z90.89 ACQUIRED ABSENCE OF OTHER ORGANS: Chronic | ICD-10-CM

## 2018-07-11 DIAGNOSIS — Z90.49 ACQUIRED ABSENCE OF OTHER SPECIFIED PARTS OF DIGESTIVE TRACT: Chronic | ICD-10-CM

## 2018-07-11 DIAGNOSIS — Z96.659 PRESENCE OF UNSPECIFIED ARTIFICIAL KNEE JOINT: Chronic | ICD-10-CM

## 2018-07-11 DIAGNOSIS — M17.11 UNILATERAL PRIMARY OSTEOARTHRITIS, RIGHT KNEE: ICD-10-CM

## 2018-07-11 DIAGNOSIS — Z98.890 OTHER SPECIFIED POSTPROCEDURAL STATES: Chronic | ICD-10-CM

## 2018-07-11 DIAGNOSIS — I10 ESSENTIAL (PRIMARY) HYPERTENSION: ICD-10-CM

## 2018-07-11 DIAGNOSIS — C61 MALIGNANT NEOPLASM OF PROSTATE: ICD-10-CM

## 2018-07-11 DIAGNOSIS — E78.2 MIXED HYPERLIPIDEMIA: ICD-10-CM

## 2018-07-11 LAB
HCT VFR BLD CALC: 34.8 % — LOW (ref 39–50)
HGB BLD-MCNC: 11.9 G/DL — LOW (ref 13–17)
MCHC RBC-ENTMCNC: 29.7 PG — SIGNIFICANT CHANGE UP (ref 27–34)
MCHC RBC-ENTMCNC: 34.2 GM/DL — SIGNIFICANT CHANGE UP (ref 32–36)
MCV RBC AUTO: 86.8 FL — SIGNIFICANT CHANGE UP (ref 80–100)
NRBC # BLD: 0 /100 WBCS — SIGNIFICANT CHANGE UP (ref 0–0)
PLATELET # BLD AUTO: 180 K/UL — SIGNIFICANT CHANGE UP (ref 150–400)
RBC # BLD: 4.01 M/UL — LOW (ref 4.2–5.8)
RBC # FLD: 13.1 % — SIGNIFICANT CHANGE UP (ref 10.3–14.5)
WBC # BLD: 5.73 K/UL — SIGNIFICANT CHANGE UP (ref 3.8–10.5)
WBC # FLD AUTO: 5.73 K/UL — SIGNIFICANT CHANGE UP (ref 3.8–10.5)

## 2018-07-11 PROCEDURE — 73560 X-RAY EXAM OF KNEE 1 OR 2: CPT | Mod: 26,RT

## 2018-07-11 PROCEDURE — 88305 TISSUE EXAM BY PATHOLOGIST: CPT | Mod: 26

## 2018-07-11 PROCEDURE — 88311 DECALCIFY TISSUE: CPT | Mod: 26

## 2018-07-11 RX ORDER — TRANEXAMIC ACID 100 MG/ML
1000 INJECTION, SOLUTION INTRAVENOUS ONCE
Qty: 0 | Refills: 0 | Status: COMPLETED | OUTPATIENT
Start: 2018-07-11 | End: 2018-07-11

## 2018-07-11 RX ORDER — ONDANSETRON 8 MG/1
4 TABLET, FILM COATED ORAL ONCE
Qty: 0 | Refills: 0 | Status: DISCONTINUED | OUTPATIENT
Start: 2018-07-11 | End: 2018-07-11

## 2018-07-11 RX ORDER — GEMFIBROZIL 600 MG
1 TABLET ORAL
Qty: 0 | Refills: 0 | COMMUNITY

## 2018-07-11 RX ORDER — SODIUM CHLORIDE 9 MG/ML
1000 INJECTION, SOLUTION INTRAVENOUS
Qty: 0 | Refills: 0 | Status: DISCONTINUED | OUTPATIENT
Start: 2018-07-11 | End: 2018-07-11

## 2018-07-11 RX ORDER — SODIUM CHLORIDE 9 MG/ML
3 INJECTION INTRAMUSCULAR; INTRAVENOUS; SUBCUTANEOUS EVERY 8 HOURS
Qty: 0 | Refills: 0 | Status: DISCONTINUED | OUTPATIENT
Start: 2018-07-11 | End: 2018-07-11

## 2018-07-11 RX ORDER — CEFAZOLIN SODIUM 1 G
2000 VIAL (EA) INJECTION EVERY 8 HOURS
Qty: 0 | Refills: 0 | Status: COMPLETED | OUTPATIENT
Start: 2018-07-11 | End: 2018-07-12

## 2018-07-11 RX ORDER — CELECOXIB 200 MG/1
200 CAPSULE ORAL ONCE
Qty: 0 | Refills: 0 | Status: COMPLETED | OUTPATIENT
Start: 2018-07-11 | End: 2018-07-11

## 2018-07-11 RX ORDER — OXYCODONE HYDROCHLORIDE 5 MG/1
20 TABLET ORAL ONCE
Qty: 0 | Refills: 0 | Status: DISCONTINUED | OUTPATIENT
Start: 2018-07-11 | End: 2018-07-11

## 2018-07-11 RX ORDER — ACETAMINOPHEN 500 MG
650 TABLET ORAL ONCE
Qty: 0 | Refills: 0 | Status: COMPLETED | OUTPATIENT
Start: 2018-07-11 | End: 2018-07-11

## 2018-07-11 RX ORDER — FENTANYL CITRATE 50 UG/ML
50 INJECTION INTRAVENOUS
Qty: 0 | Refills: 0 | Status: DISCONTINUED | OUTPATIENT
Start: 2018-07-11 | End: 2018-07-11

## 2018-07-11 RX ORDER — VALSARTAN 80 MG/1
1 TABLET ORAL
Qty: 0 | Refills: 0 | COMMUNITY

## 2018-07-11 RX ORDER — FOLIC ACID 0.8 MG
1 TABLET ORAL
Qty: 0 | Refills: 0 | COMMUNITY

## 2018-07-11 RX ORDER — ACETAMINOPHEN 500 MG
1000 TABLET ORAL ONCE
Qty: 0 | Refills: 0 | Status: DISCONTINUED | OUTPATIENT
Start: 2018-07-11 | End: 2018-07-11

## 2018-07-11 RX ADMIN — OXYCODONE HYDROCHLORIDE 5 MILLIGRAM(S): 5 TABLET ORAL at 22:26

## 2018-07-11 RX ADMIN — CELECOXIB 200 MILLIGRAM(S): 200 CAPSULE ORAL at 07:52

## 2018-07-11 RX ADMIN — SODIUM CHLORIDE 110 MILLILITER(S): 9 INJECTION INTRAMUSCULAR; INTRAVENOUS; SUBCUTANEOUS at 14:28

## 2018-07-11 RX ADMIN — OXYCODONE HYDROCHLORIDE 5 MILLIGRAM(S): 5 TABLET ORAL at 15:24

## 2018-07-11 RX ADMIN — Medication 100 MILLIGRAM(S): at 16:49

## 2018-07-11 RX ADMIN — OXYCODONE HYDROCHLORIDE 5 MILLIGRAM(S): 5 TABLET ORAL at 18:15

## 2018-07-11 RX ADMIN — Medication 650 MILLIGRAM(S): at 07:51

## 2018-07-11 RX ADMIN — Medication 100 MILLIGRAM(S): at 21:25

## 2018-07-11 RX ADMIN — Medication 975 MILLIGRAM(S): at 22:25

## 2018-07-11 RX ADMIN — OXYCODONE HYDROCHLORIDE 5 MILLIGRAM(S): 5 TABLET ORAL at 19:15

## 2018-07-11 RX ADMIN — TRANEXAMIC ACID 220 MILLIGRAM(S): 100 INJECTION, SOLUTION INTRAVENOUS at 11:27

## 2018-07-11 RX ADMIN — POLYETHYLENE GLYCOL 3350 17 GRAM(S): 17 POWDER, FOR SOLUTION ORAL at 14:24

## 2018-07-11 RX ADMIN — OXYCODONE HYDROCHLORIDE 20 MILLIGRAM(S): 5 TABLET ORAL at 07:51

## 2018-07-11 RX ADMIN — Medication 600 MILLIGRAM(S): at 18:13

## 2018-07-11 RX ADMIN — CELECOXIB 200 MILLIGRAM(S): 200 CAPSULE ORAL at 07:50

## 2018-07-11 RX ADMIN — Medication 500 MILLIGRAM(S): at 18:15

## 2018-07-11 RX ADMIN — Medication 975 MILLIGRAM(S): at 21:25

## 2018-07-11 RX ADMIN — OXYCODONE HYDROCHLORIDE 5 MILLIGRAM(S): 5 TABLET ORAL at 21:26

## 2018-07-11 RX ADMIN — Medication 975 MILLIGRAM(S): at 15:24

## 2018-07-11 RX ADMIN — Medication 100 MILLIGRAM(S): at 14:25

## 2018-07-11 RX ADMIN — OXYCODONE HYDROCHLORIDE 5 MILLIGRAM(S): 5 TABLET ORAL at 14:25

## 2018-07-11 RX ADMIN — OXYCODONE HYDROCHLORIDE 20 MILLIGRAM(S): 5 TABLET ORAL at 07:52

## 2018-07-11 RX ADMIN — Medication 975 MILLIGRAM(S): at 14:24

## 2018-07-11 RX ADMIN — SODIUM CHLORIDE 100 MILLILITER(S): 9 INJECTION, SOLUTION INTRAVENOUS at 11:40

## 2018-07-11 NOTE — BRIEF OPERATIVE NOTE - PROCEDURE
<<-----Click on this checkbox to enter Procedure Manipulation under anesthesia  07/11/2018  left knee  Active  [id number]  Right total knee replacement  07/11/2018    Active  [email or web address]

## 2018-07-11 NOTE — PATIENT PROFILE ADULT. - SPIRITUAL CULTURAL, RELIGIOUS PRACTICES/VALUES, PROFILE
PHYSICAL EXAM/DISPOSITION/HISTORY OF PRESENT ILLNESS/REVIEW OF SYSTEMS/HIV/VITAL SIGNS( Pullset)/PAST MEDICAL/SURGICAL/SOCIAL HISTORY n/a

## 2018-07-11 NOTE — CONSULT NOTE ADULT - SUBJECTIVE AND OBJECTIVE BOX
HPI: 64 yo M with h/o fatty liver, HTN, HLD, prostate CA (no treatment, being monitored), PE (s/p pelvic frature, 1996), colon rxn (s/p perforated diverticuli), recent Right TKA, OA, no s/p left TKA.  Pain with PT, but subsiding now, no n/v, no cp or sob      PAST MEDICAL & SURGICAL HISTORY:  Fatty liver  Pulmonary emboli: 1996 post pelvic fracture  Prostate CA: sees urologist  Hyperlipidemia  HTN (hypertension)  S/P knee replacement: Left ( 5/30/2018 )  S/P tonsillectomy  S/P knee replacement: left  S/P hernia repair: Bilateral with Mesh.  History of liver biopsy  S/P colon resection: secondary to perforated diverticuli- sanchez procedure and reversal      REVIEW OF SYSTEMS:    CONSTITUTIONAL: No fever, weight loss, + fatigue  EYES: No eye pain, visual disturbances, or discharge  ENMT:  No difficulty hearing, tinnitus, vertigo; No sinus or throat pain  RESPIRATORY: No cough, wheezing, chills or hemoptysis; No shortness of breath  CARDIOVASCULAR: No chest pain, palpitations, dizziness, or leg swelling  GASTROINTESTINAL: No abdominal or epigastric pain. No nausea, vomiting, or hematemesis; No diarrhea or constipation. No melena or hematochezia.  GENITOURINARY: No dysuria, frequency, hematuria, or incontinence  NEUROLOGICAL: No headaches, memory loss, loss of strength, numbness, or tremors  ENDOCRINE: No heat or cold intolerance; No hair loss  MUSCULOSKELETAL: + knee pain        MEDICATIONS  (STANDING):  acetaminophen   Tablet. 975 milliGRAM(s) Oral every 8 hours  ascorbic acid 500 milliGRAM(s) Oral two times a day  ceFAZolin   IVPB 2000 milliGRAM(s) IV Intermittent every 8 hours  docusate sodium 100 milliGRAM(s) Oral three times a day  folic acid 1 milliGRAM(s) Oral daily  gemfibrozil 600 milliGRAM(s) Oral two times a day  hydrochlorothiazide 12.5 milliGRAM(s) Oral daily  multivitamin 1 Tablet(s) Oral daily  oxyCODONE    IR 5 milliGRAM(s) Oral every 4 hours  pantoprazole    Tablet 40 milliGRAM(s) Oral daily  polyethylene glycol 3350 17 Gram(s) Oral daily  sodium chloride 0.9%. 1000 milliLiter(s) (110 mL/Hr) IV Continuous <Continuous>  valsartan 80 milliGRAM(s) Oral daily    MEDICATIONS  (PRN):  aluminum hydroxide/magnesium hydroxide/simethicone Suspension 30 milliLiter(s) Oral four times a day PRN Indigestion  HYDROmorphone  Injectable 1 milliGRAM(s) IV Push every 3 hours PRN breakthrough pain  magnesium hydroxide Suspension 30 milliLiter(s) Oral daily PRN Constipation  ondansetron Injectable 4 milliGRAM(s) IV Push every 6 hours PRN Nausea and/or Vomiting  oxyCODONE    IR 5 milliGRAM(s) Oral every 4 hours PRN pain 1 - 5  oxyCODONE    IR 10 milliGRAM(s) Oral every 4 hours PRN Pain 6 - 10  senna 2 Tablet(s) Oral at bedtime PRN Constipation      Allergies    No Known Allergies    Intolerances        SOCIAL HISTORY:    FAMILY HISTORY:      Vital Signs Last 24 Hrs  T(C): 36.6 (11 Jul 2018 20:10), Max: 36.9 (11 Jul 2018 07:06)  T(F): 97.9 (11 Jul 2018 20:10), Max: 98.4 (11 Jul 2018 07:06)  HR: 95 (11 Jul 2018 20:10) (78 - 105)  BP: 147/83 (11 Jul 2018 20:10) (108/59 - 148/79)  BP(mean): --  RR: 17 (11 Jul 2018 20:10) (14 - 17)  SpO2: 96% (11 Jul 2018 20:10) (94% - 99%)    PHYSICAL EXAM:    GENERAL: NAD, well-groomed, well-developed  HEAD:  Atraumatic, Normocephalic  EYES: EOMI, PERRLA, conjunctiva and sclera clear  ENMT: No tonsillar erythema, exudates, or enlargement; Moist mucous membranes,   NERVOUS SYSTEM:  Alert & Oriented X3, Good concentration; Motor Strength 5/5 B/L upper and lower extremities  CHEST/LUNG: Clear to percussion bilaterally; No rales, rhonchi, wheezing, or rubs  HEART: Regular rate and rhythm; No murmurs, rubs, or gallops  ABDOMEN: Soft, Nontender, Nondistended; Bowel sounds present  EXTREMITIES:  2+ Peripheral Pulses, left knee c/d/i        LABS:                        11.9   5.73  )-----------( 180      ( 11 Jul 2018 10:54 )             34.8                 RADIOLOGY & ADDITIONAL STUDIES:

## 2018-07-11 NOTE — PHYSICAL THERAPY INITIAL EVALUATION ADULT - ACTIVE RANGE OF MOTION EXAMINATION, REHAB EVAL
deficits as listed below/R knee AAROM 85 degrees of flexion, AAROM -5 degrees of extension to R knee. All other extremities WFL

## 2018-07-11 NOTE — PHYSICAL THERAPY INITIAL EVALUATION ADULT - LEVEL OF INDEPENDENCE: STAND/SIT, REHAB EVAL
Date: 7/25/2017   Phone #: [phone]  Dietitian Name: Rebecca Centeno MS RD CD   Weight: 142.3kg   BMI: 52.2   Total Weight Loss: 11 kg      Bariatric Nutrition and Activity Plan (after surgery)    1.Eat 3 small meals per day  2. Drink 48-64 ounces of liquids per day   3. No carbonated beverages  4. Choose foods low in sugar and fat  5. Take vitamins as directed  6. Aim for 60-80 grams of protein per day  7. Eat meals very slowly   8. Be physically active    Personal Goals:  1. Follow the Pureed Diet from Day 8 through Day 22 after surgery, then start Soft diet on Day 23 and follow for 2 weeks.       - After soft diet, can slowly transition to general diet, add 1 new food per meal or per day       - On pureed diet, aim for 2-3 ounces of food per meal and increase to 3-4 ounces when on soft diet  2. Drink 48-64 ounces fluids daily, which includes your protein drinks.   3. Drink 1 protein drink between meals 2-3 times daily and aim for 60-80 grams of protein daily.   4. Take vitamins as directed and bring to next visit for review:                        -1 times the adult dose of chewable or liquid multivitamin with minerals and 18mg of Iron.                        -2000 IU vitamin D3 in tablet form (not gel) once daily (no bigger than an M&M).                        -500-600 mg Calcium Citrate in chewable or liquid form 2-3 times daily at separate times.                        -Sublingual B12 - if 500 mcg take every day or if 1000 mcg take every other day.   5. Keep food records for 1-2 weeks after surgery and bring to next visit for review.   6. Call Dr. Page's office at [phone] if you have nausea with vomiting for more than one day.   7. Increase activity per MD direction as medically able          ______________________________________________________  Dietitian Signature         Date & Time    
supervision

## 2018-07-11 NOTE — DISCHARGE NOTE ADULT - NS AS ACTIVITY OBS
No Heavy lifting/straining/Walking-Indoors allowed/Do not drive or operate machinery/Showering allowed/Walking-Outdoors allowed/Stairs allowed

## 2018-07-11 NOTE — DISCHARGE NOTE ADULT - CARE PROVIDER_API CALL
Juan M Cardeans), Orthopaedic Surgery  [address] [place] [address]  Phone: [phone]  Fax: [phone]

## 2018-07-11 NOTE — PHYSICAL THERAPY INITIAL EVALUATION ADULT - CRITERIA FOR SKILLED THERAPEUTIC INTERVENTIONS
home with home PT/anticipated discharge recommendation/impairments found/therapy frequency/predicted duration of therapy intervention/functional limitations in following categories/risk reduction/prevention

## 2018-07-11 NOTE — CONSULT NOTE ADULT - PROBLEM SELECTOR RECOMMENDATION 9
cont with pain mangement and bowel regimen  OT/PT  dvt ppx as per ortho  monitor h/h  incentive spirometer  zofran prn for n/v

## 2018-07-11 NOTE — PHYSICAL THERAPY INITIAL EVALUATION ADULT - GENERAL OBSERVATIONS, REHAB EVAL
Pt seen supine in bed, alert and Ox4, R knee c ace wrap intact, pneumatic compression pumps intact. pt s/p R TKA and L knee manipulation.

## 2018-07-11 NOTE — PHYSICAL THERAPY INITIAL EVALUATION ADULT - GAIT DEVIATIONS NOTED, PT EVAL
increased time in double stance/decreased velocity of limb motion/decreased weight-shifting ability/decreased step length/decreased lam/decreased stride length

## 2018-07-11 NOTE — DISCHARGE NOTE ADULT - CARE PLAN
Principal Discharge DX:	Primary osteoarthritis of right knee  Goal:	Improve Function, Decrease Pain  Assessment and plan of treatment:	Keep Prineo Dressing Clean, Dry and Intact. May shower with Prineo Dressing. Please do not scrub, soak, peel or pick at the prineo dressing. No creams, lotions, or oils over dressing. May shower and let water run over incision, no baths. Pat dry once out of shower. Dressing to be removed in office at follow up visit in 2 weeks.

## 2018-07-11 NOTE — PROGRESS NOTE ADULT - SUBJECTIVE AND OBJECTIVE BOX
Post-op Check   POD#0 s/p Right TKA and Left Knee Manipulation under Anesthesia   63yMale Patient seen and examined, Pain controlled  Patient Denies SOB, CP, N/V/D       PE: Right Knee/LE: Dressing C/D/I, Sensation/motor intact, DP 2+, FROM ankle/toes   B/L LE: Skin intact. +ROM hip/knee/ankle/toes. Ankle Dorsi/plantarflexion: 5/5. Calf: soft, compressible and nontender. DP/PT 2+ NVI.                           11.9   5.73  )-----------( 180      ( 11 Jul 2018 10:54 )             34.8       A: As above   P: Pain Control       DVT Prophylaxis      Incentive spirometry      PT WBAT RLE, LLE      Isometric exercises      Discharge Planning      All the above discussed and understood by pt       Ortho to F/U

## 2018-07-11 NOTE — OCCUPATIONAL THERAPY INITIAL EVALUATION ADULT - PLANNED THERAPY INTERVENTIONS, OT EVAL
bed mobility training/transfer training/ADL retraining/balance training/ROM/stretching/strengthening

## 2018-07-11 NOTE — DISCHARGE NOTE ADULT - PATIENT PORTAL LINK FT
You can access the [facility] Patient Portal, offered by [facility], by registering with the following website: [email or web address]

## 2018-07-11 NOTE — OCCUPATIONAL THERAPY INITIAL EVALUATION ADULT - ADDITIONAL COMMENTS
As per pre surgical testing: Patient lives in a private house with 9 steps to enter with bilateral handrails. Once inside, the patient has  12 steps with a rail on one side to reach main floor where bedroom and bathroom is. The patients bathroom has a tub/shower combination with a regular toilet. The patient reports not having any DME inside of the bathroom. The patient had left knee replacement 6 weeks ago and has all DME from previous surgery. Wife will provide support.     pt performed toilet transfers with supervision with rolling walker, functional mobility from bathroom to recliner with rolling walker with supervision, & lower body dressing doffing/donning socks, donning underwear/shorts with supervision.

## 2018-07-11 NOTE — DISCHARGE NOTE ADULT - HOSPITAL COURSE
63yMale with history of Right Knee Pain presenting for Right TKA by Dr. Cardenas on 7/11/18. Risk and benefits of surgery were explained to the patient. The patient understood and agreed to proceed with surgery. Patient underwent the procedure with no intraoperative complications. Pt was brought in stable condition to the PACU. Once stable in PACU, pt was brought to the floor. During hospital stay pt was followed by Medicine, Pt had an uneventful hospital course. Pt is stable for discharge to Home with Home Care Services and PT

## 2018-07-11 NOTE — CONSULT NOTE ADULT - ASSESSMENT
62 yo M with h/o fatty liver, HTN, HLD, prostate CA (no treatment, being monitored), PE (s/p pelvic frature, 1996), colon rxn (s/p perforated diverticuli), recent Right TKA, OA, no s/p left TKA.

## 2018-07-11 NOTE — PHYSICAL THERAPY INITIAL EVALUATION ADULT - RANGE OF MOTION EXAMINATION, REHAB EVAL
R knee AAROM 85 degrees of flexion, AAROM -5 degrees of extension to R knee. All other extremities WFL/deficits as listed below

## 2018-07-11 NOTE — DISCHARGE NOTE ADULT - MEDICATION SUMMARY - MEDICATIONS TO TAKE
I will START or STAY ON the medications listed below when I get home from the hospital:    celecoxib 200 mg oral capsule  -- 1 cap(s) by mouth every 12 hours MDD:2 Tabs  -- Indication: For RIGHT TOTAL KNEE ARTHROPLASTY, LEFT KNEE MANIPULATION    oxyCODONE 10 mg oral tablet  -- 1 tab(s) by mouth every 4 hours, As needed, Pain 6 - 10 MDD:6 Tabs  -- Indication: For RIGHT TOTAL KNEE ARTHROPLASTY, LEFT KNEE MANIPULATION    valsartan 80 mg oral tablet  -- 1 tab(s) by mouth once a day  -- Indication: For RIGHT TOTAL KNEE ARTHROPLASTY, LEFT KNEE MANIPULATION    rivaroxaban 10 mg oral tablet  -- 1 tab(s) by mouth once a day MDD:1 Tab  -- Indication: For RIGHT TOTAL KNEE ARTHROPLASTY, LEFT KNEE MANIPULATION    gemfibrozil 600 mg oral tablet  -- 1 tab(s) by mouth 2 times a day  -- Indication: For RIGHT TOTAL KNEE ARTHROPLASTY, LEFT KNEE MANIPULATION    hydroCHLOROthiazide 12.5 mg oral capsule  -- 1 cap(s) by mouth once a day  -- Indication: For RIGHT TOTAL KNEE ARTHROPLASTY, LEFT KNEE MANIPULATION    docusate sodium 100 mg oral capsule  -- 1 cap(s) by mouth once a day  -- Indication: For RIGHT TOTAL KNEE ARTHROPLASTY, LEFT KNEE MANIPULATION    pantoprazole 40 mg oral delayed release tablet  -- 1 tab(s) by mouth once a day MDD:1 Tab  -- Indication: For RIGHT TOTAL KNEE ARTHROPLASTY, LEFT KNEE MANIPULATION    Multiple Vitamins oral tablet  -- 1 tab(s) by mouth once a day  -- Indication: For RIGHT TOTAL KNEE ARTHROPLASTY, LEFT KNEE MANIPULATION    ascorbic acid 500 mg oral tablet  -- 1 tab(s) by mouth 2 times a day  -- Indication: For RIGHT TOTAL KNEE ARTHROPLASTY, LEFT KNEE MANIPULATION    folic acid 1 mg oral tablet  -- 1 tab(s) by mouth once a day  -- Indication: For RIGHT TOTAL KNEE ARTHROPLASTY, LEFT KNEE MANIPULATION

## 2018-07-12 VITALS
DIASTOLIC BLOOD PRESSURE: 80 MMHG | HEART RATE: 87 BPM | SYSTOLIC BLOOD PRESSURE: 162 MMHG | RESPIRATION RATE: 17 BRPM | TEMPERATURE: 98 F | OXYGEN SATURATION: 99 %

## 2018-07-12 LAB
ANION GAP SERPL CALC-SCNC: 9 MMOL/L — SIGNIFICANT CHANGE UP (ref 5–17)
BUN SERPL-MCNC: 18 MG/DL — SIGNIFICANT CHANGE UP (ref 7–23)
CALCIUM SERPL-MCNC: 8.5 MG/DL — SIGNIFICANT CHANGE UP (ref 8.5–10.1)
CHLORIDE SERPL-SCNC: 103 MMOL/L — SIGNIFICANT CHANGE UP (ref 96–108)
CO2 SERPL-SCNC: 27 MMOL/L — SIGNIFICANT CHANGE UP (ref 22–31)
CREAT SERPL-MCNC: 1.07 MG/DL — SIGNIFICANT CHANGE UP (ref 0.5–1.3)
GLUCOSE SERPL-MCNC: 109 MG/DL — HIGH (ref 70–99)
HCT VFR BLD CALC: 32.8 % — LOW (ref 39–50)
HGB BLD-MCNC: 11.3 G/DL — LOW (ref 13–17)
MCHC RBC-ENTMCNC: 29.7 PG — SIGNIFICANT CHANGE UP (ref 27–34)
MCHC RBC-ENTMCNC: 34.5 GM/DL — SIGNIFICANT CHANGE UP (ref 32–36)
MCV RBC AUTO: 86.3 FL — SIGNIFICANT CHANGE UP (ref 80–100)
NRBC # BLD: 0 /100 WBCS — SIGNIFICANT CHANGE UP (ref 0–0)
PLATELET # BLD AUTO: 162 K/UL — SIGNIFICANT CHANGE UP (ref 150–400)
POTASSIUM SERPL-MCNC: 3.6 MMOL/L — SIGNIFICANT CHANGE UP (ref 3.5–5.3)
POTASSIUM SERPL-SCNC: 3.6 MMOL/L — SIGNIFICANT CHANGE UP (ref 3.5–5.3)
RBC # BLD: 3.8 M/UL — LOW (ref 4.2–5.8)
RBC # FLD: 12.9 % — SIGNIFICANT CHANGE UP (ref 10.3–14.5)
SODIUM SERPL-SCNC: 139 MMOL/L — SIGNIFICANT CHANGE UP (ref 135–145)
WBC # BLD: 9.2 K/UL — SIGNIFICANT CHANGE UP (ref 3.8–10.5)
WBC # FLD AUTO: 9.2 K/UL — SIGNIFICANT CHANGE UP (ref 3.8–10.5)

## 2018-07-12 RX ORDER — PANTOPRAZOLE SODIUM 20 MG/1
1 TABLET, DELAYED RELEASE ORAL
Qty: 0 | Refills: 0 | COMMUNITY

## 2018-07-12 RX ORDER — OXYCODONE HYDROCHLORIDE 5 MG/1
1 TABLET ORAL
Qty: 42 | Refills: 0 | OUTPATIENT
Start: 2018-07-12 | End: 2018-07-18

## 2018-07-12 RX ORDER — CELECOXIB 200 MG/1
1 CAPSULE ORAL
Qty: 60 | Refills: 0 | OUTPATIENT
Start: 2018-07-12 | End: 2018-08-10

## 2018-07-12 RX ORDER — RIVAROXABAN 15 MG-20MG
1 KIT ORAL
Qty: 30 | Refills: 0 | OUTPATIENT
Start: 2018-07-12 | End: 2018-08-10

## 2018-07-12 RX ORDER — PANTOPRAZOLE SODIUM 20 MG/1
1 TABLET, DELAYED RELEASE ORAL
Qty: 30 | Refills: 0 | OUTPATIENT
Start: 2018-07-12 | End: 2018-08-10

## 2018-07-12 RX ORDER — COLESEVELAM HYDROCHLORIDE 625 MG/1
1 TABLET, FILM COATED ORAL
Qty: 0 | Refills: 0 | COMMUNITY

## 2018-07-12 RX ADMIN — Medication 100 MILLIGRAM(S): at 00:28

## 2018-07-12 RX ADMIN — Medication 600 MILLIGRAM(S): at 05:19

## 2018-07-12 RX ADMIN — OXYCODONE HYDROCHLORIDE 10 MILLIGRAM(S): 5 TABLET ORAL at 00:30

## 2018-07-12 RX ADMIN — ONDANSETRON 4 MILLIGRAM(S): 8 TABLET, FILM COATED ORAL at 09:32

## 2018-07-12 RX ADMIN — RIVAROXABAN 10 MILLIGRAM(S): KIT at 11:30

## 2018-07-12 RX ADMIN — SODIUM CHLORIDE 110 MILLILITER(S): 9 INJECTION INTRAMUSCULAR; INTRAVENOUS; SUBCUTANEOUS at 00:27

## 2018-07-12 RX ADMIN — Medication 975 MILLIGRAM(S): at 06:19

## 2018-07-12 RX ADMIN — OXYCODONE HYDROCHLORIDE 5 MILLIGRAM(S): 5 TABLET ORAL at 05:18

## 2018-07-12 RX ADMIN — Medication 975 MILLIGRAM(S): at 13:14

## 2018-07-12 RX ADMIN — Medication 500 MILLIGRAM(S): at 05:19

## 2018-07-12 RX ADMIN — OXYCODONE HYDROCHLORIDE 5 MILLIGRAM(S): 5 TABLET ORAL at 06:18

## 2018-07-12 RX ADMIN — Medication 102 MILLIGRAM(S): at 05:19

## 2018-07-12 RX ADMIN — HYDROMORPHONE HYDROCHLORIDE 1 MILLIGRAM(S): 2 INJECTION INTRAMUSCULAR; INTRAVENOUS; SUBCUTANEOUS at 01:47

## 2018-07-12 RX ADMIN — Medication 1 TABLET(S): at 11:30

## 2018-07-12 RX ADMIN — POLYETHYLENE GLYCOL 3350 17 GRAM(S): 17 POWDER, FOR SOLUTION ORAL at 11:30

## 2018-07-12 RX ADMIN — OXYCODONE HYDROCHLORIDE 5 MILLIGRAM(S): 5 TABLET ORAL at 09:33

## 2018-07-12 RX ADMIN — CELECOXIB 200 MILLIGRAM(S): 200 CAPSULE ORAL at 06:18

## 2018-07-12 RX ADMIN — Medication 12.5 MILLIGRAM(S): at 05:19

## 2018-07-12 RX ADMIN — Medication 100 MILLIGRAM(S): at 05:19

## 2018-07-12 RX ADMIN — HYDROMORPHONE HYDROCHLORIDE 1 MILLIGRAM(S): 2 INJECTION INTRAMUSCULAR; INTRAVENOUS; SUBCUTANEOUS at 06:39

## 2018-07-12 RX ADMIN — OXYCODONE HYDROCHLORIDE 5 MILLIGRAM(S): 5 TABLET ORAL at 10:11

## 2018-07-12 RX ADMIN — VALSARTAN 80 MILLIGRAM(S): 80 TABLET ORAL at 05:19

## 2018-07-12 RX ADMIN — CELECOXIB 200 MILLIGRAM(S): 200 CAPSULE ORAL at 05:18

## 2018-07-12 RX ADMIN — HYDROMORPHONE HYDROCHLORIDE 1 MILLIGRAM(S): 2 INJECTION INTRAMUSCULAR; INTRAVENOUS; SUBCUTANEOUS at 01:32

## 2018-07-12 RX ADMIN — OXYCODONE HYDROCHLORIDE 10 MILLIGRAM(S): 5 TABLET ORAL at 11:33

## 2018-07-12 RX ADMIN — Medication 975 MILLIGRAM(S): at 05:19

## 2018-07-12 RX ADMIN — OXYCODONE HYDROCHLORIDE 10 MILLIGRAM(S): 5 TABLET ORAL at 01:30

## 2018-07-12 RX ADMIN — Medication 1 MILLIGRAM(S): at 11:30

## 2018-07-12 RX ADMIN — Medication 100 MILLIGRAM(S): at 13:14

## 2018-07-12 RX ADMIN — OXYCODONE HYDROCHLORIDE 5 MILLIGRAM(S): 5 TABLET ORAL at 04:30

## 2018-07-12 RX ADMIN — PANTOPRAZOLE SODIUM 40 MILLIGRAM(S): 20 TABLET, DELAYED RELEASE ORAL at 11:30

## 2018-07-12 RX ADMIN — HYDROMORPHONE HYDROCHLORIDE 1 MILLIGRAM(S): 2 INJECTION INTRAMUSCULAR; INTRAVENOUS; SUBCUTANEOUS at 06:24

## 2018-07-12 RX ADMIN — Medication 975 MILLIGRAM(S): at 13:15

## 2018-07-12 RX ADMIN — OXYCODONE HYDROCHLORIDE 5 MILLIGRAM(S): 5 TABLET ORAL at 03:37

## 2018-07-12 RX ADMIN — OXYCODONE HYDROCHLORIDE 10 MILLIGRAM(S): 5 TABLET ORAL at 12:30

## 2018-07-12 NOTE — PROGRESS NOTE ADULT - SUBJECTIVE AND OBJECTIVE BOX
POD#1 s/p Right TKA  63yMale Patient seen and examined with Dr. Cardenas, Pain controlled  Patient Denies SOB, CP, N/V/D       PE: Right Knee/LE: Dressing C/D/I, Sensation/motor intact, DP 2+, FROM ankle/toes   B/L LE: Skin intact. +ROM hip/knee/ankle/toes. Ankle Dorsi/plantarflexion: 5/5. Calf: soft, compressible and nontender. DP/PT 2+ NVI.                           11.3   9.20  )-----------( 162      ( 12 Jul 2018 07:26 )             32.8       07-12    139  |  103  |  18  ----------------------------<  109<H>  3.6   |  27  |  1.07    Ca    8.5      12 Jul 2018 07:26          A: As above   P: Pain Control       DVT Prophylaxis      Incentive spirometry      PT WBAT RLE      Isometric exercises      Discharge Planning      All the above discussed and understood by pt       Ortho to F/U

## 2018-07-13 LAB — SURGICAL PATHOLOGY FINAL REPORT - CH: SIGNIFICANT CHANGE UP

## 2018-07-18 DIAGNOSIS — E78.2 MIXED HYPERLIPIDEMIA: ICD-10-CM

## 2018-07-18 DIAGNOSIS — C61 MALIGNANT NEOPLASM OF PROSTATE: ICD-10-CM

## 2018-07-18 DIAGNOSIS — Z96.652 PRESENCE OF LEFT ARTIFICIAL KNEE JOINT: ICD-10-CM

## 2018-07-18 DIAGNOSIS — I10 ESSENTIAL (PRIMARY) HYPERTENSION: ICD-10-CM

## 2018-07-18 DIAGNOSIS — M17.11 UNILATERAL PRIMARY OSTEOARTHRITIS, RIGHT KNEE: ICD-10-CM

## 2018-07-18 DIAGNOSIS — K76.0 FATTY (CHANGE OF) LIVER, NOT ELSEWHERE CLASSIFIED: ICD-10-CM

## 2018-09-13 NOTE — PATIENT PROFILE ADULT. - BLOOD AVOIDANCE/RESTRICTIONS, PROFILE
Health Maintenance Due   Topic Date Due   • Pneumococcal 19-64 Medium Risk (1 of 1 - PPSV23) 11/03/1981   • Colorectal Cancer Screening-Colonoscopy  11/03/2012   • Breast Cancer Screening  11/03/2017   • Influenza Vaccine (1) 09/01/2018       Patient is due for topics as listed above but declines Immunization(s) Pneumococcal at this time.  Orders placed for  Colorectal Cancer Screening:Cologuard and Mammogram. Previously ordered.  Will get influenza through work.           none

## 2019-06-26 NOTE — H&P PST ADULT - PHYSICIAN'S ASSESSMENT OF RISK
Chief Complaint   Patient presents with   • Annual Exam       HISTORY OF PRESENT ILLNESS: Patient is a 31 y.o. female est patient who presents today to discuss the following issues:    1. Well adult exam  This is a pleasant 31 year old female with hx of pituitary microadenoma producing prolactin on bromocriptine followed by Dr. Waller with anticipated next check in late August.  She reports feeling well with normal menses and minimal breast discharge.  She is due for PAP.  She updates me that she has been having 2 months of lip swelling intermittently typically upon awakening.  No new Rx's, supplements, cosmetics,  She also will get intermittent non-painful loose stools or diarrhea.  No melena or hematochezia.  She has had an aphthous ulcer that has been ongoing since the angioedema.  She denies any difficulty breathing.  No tongue swelling.            Active Ambulatory Problems     Diagnosis Date Noted   • Nipple discharge 07/31/2013   • Acne 07/31/2013   • Vertigo 11/05/2013   • Gastritis 11/05/2013   • Hyperprolactinemia (HCC) 11/05/2013   • Prolactinoma ([facility]) 05/03/2017     Resolved Ambulatory Problems     Diagnosis Date Noted   • No Resolved Ambulatory Problems     Past Medical History:   Diagnosis Date   • Acne 7/31/2013   • Nipple discharge 7/31/2013       Allergies:Nkda [no known drug allergy]    Current Outpatient Prescriptions   Medication Sig Dispense Refill   • vitamin D, Ergocalciferol, (DRISDOL) 40289 units Cap capsule Take 1 Cap by mouth every 14 days. 6 Cap 1   • bromocriptine (PARLODEL) 2.5 MG Tab Take 1 Tab by mouth every day. 90 Tab 3   • fluticasone (FLONASE) 50 MCG/ACT nasal spray Spray 2 Sprays in nose every day. Each Nostril 16 g 11   • Cyanocobalamin (VITAMIN B-12) 1000 MCG SL Tab Place 1 Tab under tongue every day. 30 Tab 3     No current facility-administered medications for this visit.        Social History   Substance Use Topics   • Smoking status: Never Smoker   • Smokeless tobacco:  "Never Used   • Alcohol use No       Family Status   Relation Status   • Mo Alive   • Fa Alive     Family History   Problem Relation Age of Onset   • Hypertension Mother      Health Maintenance Due   Topic Date Due   • IMM DTaP/Tdap/Td Vaccine (1 - Tdap) 01/12/2005   • PAP SMEAR  07/31/2015       ROS:  Review of Systems   Constitutional: Negative for fever, chills, weight loss and malaise/fatigue.   HENT: Negative for ear pain, nosebleeds, congestion, sore throat and neck pain.    Eyes: Negative for blurred vision.   Respiratory: Negative for cough, sputum production, shortness of breath and wheezing.    Cardiovascular: Negative for chest pain, palpitations, orthopnea and leg swelling.   Gastrointestinal: Negative for heartburn, nausea, vomiting and abdominal pain.   Genitourinary: Negative for dysuria, urgency and frequency.   Musculoskeletal: Negative for myalgias, back pain and joint pain.   Skin: Negative for rash and itching.   Neurological: Negative for dizziness, tingling, tremors, sensory change, focal weakness and headaches.   Endo/Heme/Allergies: Does not bruise/bleed easily.   Psychiatric/Behavioral: Negative for depression, suicidal ideas and memory loss.  The patient is not nervous/anxious and does not have insomnia.      Exam:  /68   Pulse 78   Temp 37.3 °C (99.1 °F)   Resp 14   Ht 1.6 m (5' 3\")   Wt 53.1 kg (117 lb)   SpO2 99%   General:  Well nourished, well developed female in NAD  HEENT: Normocephalic and atraumatic. TMs clear bilaterally. Oropharynx is clear      without erythema and no tonsillar exudate. Nasal mucosa pink with minimal      Rhinorrhea.  EYES: EOMI.  Conjunctiva clear.    Neck: Supple without JVD or bruit. Thyroid is not enlarged.  Pulmonary: Clear to ausculation and percussion.  Normal effort. No rales, ronchi, or wheezing.  Cardiovascular: Regular rate and rhythm without murmur, no peripheral edema  Abdomen: positive bowel sounds.  Non tender, non distended, no " hepatosplenomegaly.   MSK: normal ROM in upper and lower extremities bilaterally  Psych: appropriate affect and concentration, oriented to person and place  Neuro: no unilateral weakness in upper or lower extremities.  No gait disturbance  External genitalia without lesions  Vaginal mucosa pink and well rugated  Minimal cervical discharge  Pap obtained    Bimanual exam shows normal positioned and sized uterus with no adnexal tenderness or masses      Please note that this dictation was created using voice recognition software. I have made every reasonable attempt to correct obvious errors, but I expect that there are errors of grammar and possibly content that I did not discover before finalizing the note.    Assessment/Plan:  1. Well adult exam    - CBC WITH DIFFERENTIAL; Future  - Comp Metabolic Panel; Future  - TSH WITH REFLEX TO FT4; Future    2. Angioedema, initial encounter  Awaiting allergy testing    3. Screening for deficiency anemia  - CBC WITH DIFFERENTIAL; Future    4. Screening for endocrine disorder  - Comp Metabolic Panel; Future  - TSH WITH REFLEX TO FT4; Future    5. Screening for cervical cancer  - THINPREP PAP WITH HPV; Future    Mammogram at 38.      High Risk

## 2021-07-28 NOTE — H&P PST ADULT - EYES
no dysmetria, normal finger to nose/cranial nerves 2-12 intact/central and peripheral vision intact/extra-ocular movements intact/tongue is midline EOMI; PERRL; no drainage or redness

## 2021-12-15 NOTE — H&P PST ADULT - NSANTHOBSERVEDRD_ENT_A_CORE
doxycycline 100mg by mouth  2times a day for 10 days  follow up with primary care doctor in 7 - 10 days
No

## 2022-01-04 NOTE — PATIENT PROFILE ADULT. - TRANSFUSION REACTION, PREVIOUS, PROFILE
Assessment / Plan    1  Encounter for gynecological examination without abnormal finding  Normal well woman exam  2020 pap negative  Repeat   Declines contraception and G/C screening as she has been abstinent  RV one year      Kalpana Jimenez is a [place] 37 y o  female who presents for her annual gynecologic exam     Rupesh 38 yo  presents for yearly GYN exam     Doing well, no GYN complaints  Not using BC as she is abstinent  2020 p/h and g/c negative  Sees a therapist who suspects she has bipolar disorder  She will be seeing a psychiatrist to confirm  Periods are regular  Current contraception: abstinence  History of abnormal Pap smear: no  Family history of breast,uterine, ovarian or colon cancer: yes - mgm with breast ca    Menstrual History:  OB History        2    Para   1    Term   1       0    AB   1    Living   1       SAB   0    IAB   1    Ectopic   0    Multiple   0    Live Births   1           Obstetric Comments   Menarche: 10    Cycles regular, Q29d, x 4d              Patient's last menstrual period was 2021  The following portions of the patient's history were reviewed and updated as appropriate: allergies, current medications, past family history, past medical history, past social history, past surgical history and problem list     Review of Systems      Review of Systems   Constitutional: Negative for chills and fever  Respiratory: Negative for cough and shortness of breath  Gastrointestinal: Negative for abdominal distention, abdominal pain, blood in stool, constipation, diarrhea, nausea and vomiting  Genitourinary: Negative for difficulty urinating, dysuria, frequency, genital sores, hematuria, menstrual problem, pelvic pain, urgency, vaginal bleeding and vaginal discharge  Musculoskeletal: Negative for arthralgias and myalgias       Breasts:  Negative for skin changes, dimpling, asymmetry, nipple discharge, redness, tenderness or palpable masses    Objective      /74 (BP Location: Left arm, Patient Position: Sitting, Cuff Size: Standard)   Ht 5' 4" (1 626 m)   Wt 77 6 kg (171 lb)   LMP 12/20/2021   BMI 29 35 kg/m²   Physical Exam  Constitutional:       General: She is not in acute distress  Appearance: Normal appearance  She is well-developed  She is not ill-appearing or diaphoretic  Comments: bmi 29 4   HENT:      Head: Normocephalic and atraumatic  Eyes:      Pupils: Pupils are equal, round, and reactive to light  Neck:      Thyroid: No thyromegaly  Pulmonary:      Effort: Pulmonary effort is normal    Chest:   Breasts: Breasts are symmetrical       Right: No inverted nipple, mass, nipple discharge, skin change, tenderness or supraclavicular adenopathy  Left: No inverted nipple, mass, nipple discharge, skin change, tenderness or supraclavicular adenopathy  Abdominal:      General: There is no distension  Palpations: Abdomen is soft  There is no mass  Tenderness: There is no abdominal tenderness  There is no guarding or rebound  Genitourinary:     General: Normal vulva  Exam position: Lithotomy position  Labia:         Right: No rash, tenderness, lesion or injury  Left: No rash, tenderness, lesion or injury  Vagina: No signs of injury and foreign body  No vaginal discharge, erythema, tenderness or bleeding  Cervix: No cervical motion tenderness, discharge or friability  Uterus: Not enlarged and not tender  Adnexa:         Right: No mass or tenderness  Left: No mass or tenderness  Musculoskeletal:      Cervical back: Neck supple  Lymphadenopathy:      Cervical: No cervical adenopathy  Upper Body:      Right upper body: No supraclavicular adenopathy  Left upper body: No supraclavicular adenopathy  Skin:     General: Skin is warm and dry  Neurological:      General: No focal deficit present        Mental Status: She is alert and oriented to person, place, and time  Psychiatric:         Mood and Affect: Mood normal          Behavior: Behavior normal          Thought Content:  Thought content normal          Judgment: Judgment normal  no

## 2022-05-31 PROBLEM — K76.0 FATTY (CHANGE OF) LIVER, NOT ELSEWHERE CLASSIFIED: Chronic | Status: ACTIVE | Noted: 2018-05-16

## 2022-05-31 PROBLEM — I10 ESSENTIAL (PRIMARY) HYPERTENSION: Chronic | Status: ACTIVE | Noted: 2018-05-16

## 2022-05-31 PROBLEM — E78.5 HYPERLIPIDEMIA, UNSPECIFIED: Chronic | Status: ACTIVE | Noted: 2018-05-16

## 2022-05-31 PROBLEM — I26.99 OTHER PULMONARY EMBOLISM WITHOUT ACUTE COR PULMONALE: Chronic | Status: ACTIVE | Noted: 2018-05-16

## 2022-05-31 PROBLEM — C61 MALIGNANT NEOPLASM OF PROSTATE: Chronic | Status: ACTIVE | Noted: 2018-05-16

## 2022-06-01 ENCOUNTER — TRANSCRIPTION ENCOUNTER (OUTPATIENT)
Age: 67
End: 2022-06-01

## 2022-06-01 ENCOUNTER — APPOINTMENT (OUTPATIENT)
Dept: ORTHOPEDIC SURGERY | Facility: CLINIC | Age: 67
End: 2022-06-01
Payer: MEDICARE

## 2022-06-01 VITALS — WEIGHT: 165 LBS | BODY MASS INDEX: 25.01 KG/M2 | HEIGHT: 68 IN

## 2022-06-01 DIAGNOSIS — I10 ESSENTIAL (PRIMARY) HYPERTENSION: ICD-10-CM

## 2022-06-01 DIAGNOSIS — M76.821 POSTERIOR TIBIAL TENDINITIS, RIGHT LEG: ICD-10-CM

## 2022-06-01 DIAGNOSIS — M25.571 PAIN IN RIGHT ANKLE AND JOINTS OF RIGHT FOOT: ICD-10-CM

## 2022-06-01 PROBLEM — Z00.00 ENCOUNTER FOR PREVENTIVE HEALTH EXAMINATION: Status: ACTIVE | Noted: 2022-06-01

## 2022-06-01 PROCEDURE — 99204 OFFICE O/P NEW MOD 45 MIN: CPT | Mod: 25

## 2022-06-01 PROCEDURE — 99214 OFFICE O/P EST MOD 30 MIN: CPT | Mod: 25

## 2022-06-01 PROCEDURE — 73610 X-RAY EXAM OF ANKLE: CPT | Mod: RT

## 2022-06-01 PROCEDURE — L1902: CPT | Mod: RT

## 2022-06-01 PROCEDURE — 73630 X-RAY EXAM OF FOOT: CPT | Mod: RT

## 2022-06-01 NOTE — PHYSICAL EXAM
[de-identified] : General: Well-nourished, well developed female in no apparent distress\par Psych: Clear speech, pleasant mood and affect\par Neurological: Alert and oriented to person, place, and time\par Gait: Antalgic\par Respiratory: Normal respiratory effort\par Skin: No rashes, no lesions, no open skin, no ecchymosis, no erythema.\par \par LEFT/RIGHT Lower Extremity:\par \par Inspection: There is mild to moderate swelling without ecchymosis or erythema over the posterior medial aspect of the ankle and hindfoot, Hindfoot alignment is in increased valgus. There is midfoot abduction noted. There is loss of medial arch height. They are able to perform a single leg heel rise.\par \par Palpation: No anterior ankle joint line tenderness. There is no pain over the ATFL, deltoid ligament, lateral malleolus, medial malleolus, or anterior syndesmosis. No pain over the course of the achilles, peroneal tendons. They are diffusely tender over the posterior tibial tendon and there is swelling noted over the course of the posterior tibial tendon. The hindfoot is flexible and passively correctable as well as the transverse tarsal joints. There is no peroneal tendon instability. Downgoing Mcintosh test. No pain pain over the calcaneus or over the heel. Diffusely tender over the sinus tarsi. No\par pain or instability with passive inversion/eversion. Calf is soft and nontender.\par On examination of the foot: No pain over the talar neck or talar head. No tenderness over the transverse tarsal joints, TMT or the Lisfranc joints on palpation and stress examination. No tenderness over the navicular, cuboid, cuneiforms, or metatarsals shafts. Full range of motion through the MTP joints. No pain on examination of the toes.\par \par ROM: 5-8 degrees of dorsiflexion, 25 degrees of plantar flexion, and 10 degrees of eversion without pain or crepitus. There is limited inversion to neutral with weakness and pain.\par \par Muscle strength: 5/5 strength with resisted dorsiflexion, plantar flexion, and eversion.\par 4/5 strength on resisted inversion only to neutral. Stability: No instability noted with varus/valgus stress. Negative anterior drawer test. Negative syndesmotic squeeze test.\par \par Neurologic Exam Sensation grossly intact to light touch throughout. There is a 2+ Achilles tendon reflexes with downgoing Babinski test. Vascular Palpable dorsalis pedis and posterior tibial pulses with brisk capillary refill in all toes.\par \par XRAYS (3v Ankle and foot): Xrays done today in the office were 3 views of the ankle and foot with no limitations to today studies and no comparative studies available for review which reveal no acute fractures or dislocations. The ankle mortise appears to be reduced and well maintained. Hindfoot alignment is in increased valgus. Loss of medial arch height is noted. Midfoot abduction is noted With significant talonavicular joint uncoverage. No fractures of the lateral process of the talus or of the anterior process of the calcaneus are noted.\par

## 2022-06-01 NOTE — ASSESSMENT
[FreeTextEntry1] : After their examination today in the office and review of the radiographs I do think they have developed a localized pain and inflammation over the medial aspect of the ankle and foot secondary to stage I posterior tibial tendon dysfunction and tendinitis. As a result of their limitations in their daily and physical activities as well as their pain, I had a lengthy discussion with the patient regarding my findings and treatment options. The patient has stage I posterior tibial tendon dysfunction, which can be treated with non operative management. We discussed the possible progressive nature of posterior tibial tendon dysfunction that can lead to a progressive degeneration of the posterior tibial tendon as well as a progressive flattening deformity of the ankle and foot. I recommend the following plan:\par \par 1. Start daily ASO bracing,\par 2. Custom orthotics prescription with a medial posting sent to, obtain prefabricated inserts as alternative\par 3. Start PO Mobic QD\par \par F/u in 4 weeks, start PT

## 2022-06-01 NOTE — HISTORY OF PRESENT ILLNESS
[Gradual] : gradual [9] : 9 [2] : 2 [Dull/Aching] : dull/aching [Localized] : localized [Sharp] : sharp [Frequent] : frequent [Meds] : meds [Ice] : ice [Retired] : Work status: retired [de-identified] :  FRANK AMICO is a 67 year male who is here today for eval of the right ankle, pt states due to improper foot wear he has hurt his right ankle and there's swelling going on the sides of the rt ankle and pain during movement.\par \par Mr. AMICO is a 67 year male who presents today for evaluation of their Right ankle pain and swelling. He states that a few days ago after wearing a pair of poorly supportive shoes he began to experience pain and swelling more on the inside aspect of the ankle and foot and the front aspect of the ankle. He has been trying to rest the ankle and foot over the past few days and has started to notice some improvements in his pain. With prolonged walking or standing he does notice some increased pain. He denies any numbness tingling or burning or any increase or deformity of the ankle or foot [] : no [FreeTextEntry1] : RT ankle [FreeTextEntry3] : 1 week [de-identified] : ice, compression wrap, Tylenol

## 2022-06-30 ENCOUNTER — RX RENEWAL (OUTPATIENT)
Age: 67
End: 2022-06-30

## 2022-06-30 RX ORDER — MELOXICAM 15 MG/1
15 TABLET ORAL DAILY
Qty: 30 | Refills: 0 | Status: ACTIVE | COMMUNITY
Start: 2022-06-01 | End: 1900-01-01

## 2022-08-02 NOTE — H&P PST ADULT - OPHTHALMOLOGIC
"  Dear Tristan,    I saw Samy Elena in the office today in consultation. Please see my note below.    If you have any additional questions, please do not hesitate to call me.    Sincerely,    Jose F Bryant MD      _____________________________________      Colon and Rectal Surgery Consult    Subjective     Samy Elena is a 88 y.o. male who is referred for consultation from the [facility] for a \"pilonidal cyst\".  The patient developed pain in the buttock area over the past 3 to 4 days.  He is having difficulty with sitting.  Is also having significant edema around the base of the penis and suprapubic area with a lot of discomfort.    He is trying to recover from recent hospitalization for change in mental status secondary to hypotension/hypoperfusion.  He did not appear to have a definable CVA.    The patient has extensive medical problems including congestive heart failure with low ejection fraction, history of MI, and chronic atrial fibrillation, not on anticoagulation.    Medical History:   Past Medical History:   Diagnosis Date   • Atrial fibrillation (CMS/HCC)    • Disease of thyroid gland    • Hypertension    • Myocardial infarction (CMS/HCC)        Surgical History:   Past Surgical History:   Procedure Laterality Date   • BYPASS GRAFT         Social History:   Social History     Tobacco Use   • Smoking status: Never Smoker   • Smokeless tobacco: Never Used       Family History: No family history on file.    Allergies: Patient has no known allergies.    Current Medications:  •  acetaminophen  •  bisacodyL  •  clopidogreL  •  empagliflozin  •  levothyroxine  •  metoprolol succinate XL  •  ENTRESTO  •  simvastatin  •  spironolactone  •  coenzyme Q10  •  multivitamin  •  sildenafiL (pulm.hypertension)  •  torsemide    Review of Systems  Review of Systems   Respiratory: Negative for shortness of breath.    Cardiovascular: Negative for chest pain.   Gastrointestinal: Positive for " "rectal pain. Negative for abdominal distention, abdominal pain, diarrhea and nausea.   All other systems reviewed and are negative.      Objective     Physicial Exam  Visit Vitals  BP 94/64 (Patient Position: Sitting)   Pulse 85   Ht 1.854 m (6' 1\")   Wt 80.3 kg (177 lb)   BMI 23.35 kg/m²       Physical Exam  Vitals reviewed.   Constitutional:       Appearance: Normal appearance. He is well-developed.      Comments: Extremely frail elderly gentleman who can only stand and pivot with assistance.   HENT:      Head: Normocephalic and atraumatic.      Right Ear: Decreased hearing noted.      Left Ear: Decreased hearing noted.   Eyes:      Pupils: Pupils are equal, round, and reactive to light.   Cardiovascular:      Rate and Rhythm: Normal rate. Rhythm irregularly irregular.      Heart sounds: Normal heart sounds. No murmur heard.  Pulmonary:      Effort: Pulmonary effort is normal. No respiratory distress.      Breath sounds: Examination of the right-lower field reveals rales. Rales present. No wheezing.   Abdominal:      General: There is no distension.      Palpations: Abdomen is soft. There is no mass.      Tenderness: There is no abdominal tenderness.      Hernia: No hernia is present.   Genitourinary:     Comments: A lot of edema at the suprapubic area and base of the penis with significant edema of the penis itself.  He has some induration and pain in this area.  There is induration with tenderness in the left anterior perianal area with a small skin incision with some bloody purulent discharge.  I probed this and was able to find tracking into the buttock and anterior towards the perineum.  The area was infiltrated with buffered lidocaine with epinephrine and then cavity was incised to find a large amount of bloody purulent fluid with a lot of necrotic debris and very poor odor.  There is tracking of the wound posteriorly and anteriorly with a lot of necrotic tissue and microthrombi.  Musculoskeletal:         " General: No tenderness. Normal range of motion.      Cervical back: Normal range of motion and neck supple.   Lymphadenopathy:      Cervical: No cervical adenopathy.   Skin:     General: Skin is warm and dry.      Capillary Refill: Capillary refill takes less than 2 seconds.      Findings: No rash.   Neurological:      Mental Status: He is alert and oriented to person, place, and time.      Cranial Nerves: No cranial nerve deficit.   Psychiatric:         Mood and Affect: Mood normal.         Behavior: Behavior normal.             Labs  No new labs.    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  He needs to be taken to the emergency room immediately and then we will need to take him to the operating room and do extensive debridement of this area.  He ultimately may need a colostomy depending on how much of the anus I can save.    I discussed extensively with his son.  The patient is very elderly and a high surgical risk but this is a life-threatening emergency.                       Mat Bryant MD         negative

## 2023-11-28 NOTE — OCCUPATIONAL THERAPY INITIAL EVALUATION ADULT - TRANSFER SAFETY CONCERNS NOTED: SIT/STAND, REHAB EVAL
no
Patent
decreased step length/decreased sequencing ability/decreased weight-shifting ability/decreased balance during turns

## 2024-03-08 NOTE — PATIENT PROFILE ADULT. - IS PATIENT PREGNANT?
Stop taking coreg/carvedilol   Please begin taking toprol xl 50 mg once daily - take at night.        
not applicable (Male)

## 2024-05-20 NOTE — BRIEF OPERATIVE NOTE - ESTIMATED BLOOD LOSS
"Message from Dr. Leon:  Vince Leon MD  P [facility] Heart Team 2  Lipids are still elevated, however improved with the ezetimibe. I am glad his rash improved and it was not likely related to the ezetimibe. Repatha was declined by his insurance in the past given the negative genetics testing, though the genetics testing is not 100% sensitive. We can try again in the future if needed    1340 left a detailed message for the patient with the update on his recent lipids. My chart message sent as back up.    Kiran Mr. Nicole,    Dr. Leon reviewed your recent lipids and said, \"Lipids are still elevated, however improved with the ezetimibe. I am glad his rash improved and it was not likely related to the ezetimibe. Repatha was declined by his insurance in the past given the negative genetics testing, though the genetics testing is not 100% sensitive. We can try again in the future if needed+.    Your next appointments:  8/9/2024 @ 7:45 at the [facility]  8/12/2024 @ 9:45 with Dr. Leon in [place]    Thank you  Team 2 R.N.s  [phone]      "
100

## 2024-05-23 NOTE — PATIENT PROFILE ADULT. - FUNCTIONAL SCREEN CURRENT LEVEL: COMMUNICATION, MLM
Breast Oncology Nurse Navigator    Called patient to check in after recent visit with Dr Valdez.  Left a detailed voicemail and included my call back information.  Requested a call back.   61 yo male with PMHx HTN, HLD, CAD, rectal adenocarcinoma s/p resection and diverting ileostomy s/p APR rectum/sigmoid colon with colostomy creation, on chemotherapy, with decreased urine output from castillo with dark urine/sediment/clot. pt's tumor along bladder wall with ?hx of fistula in past. bladder scan after replacing castillo with no urine in bladder (and no significant fluid in casitllo). will check ct, hydrate. labs, reassess. (0) understands/communicates without difficulty

## 2024-11-05 NOTE — OCCUPATIONAL THERAPY INITIAL EVALUATION ADULT - WEIGHT-BEARING RESTRICTIONS: TOILET, REHAB EVAL
Subjective   Patient ID: Beatris Ponce is a 65 y.o. female who presents for follow up regarding RA.    HPI 66 yo F here for f/u re: RA. (Onset of symptoms was 2006-rheumatoid factor negative, Citrulline antibody 41, EMA 1:160 with negative panel), CRP 1.8, ESR 32,  She started  mg daily 4/26/24.     She had a brief flare about 3 weeks ago.  She first developed pain in her left elbow, followed 1 day later by pain in her left fourth and fifth MCP joints, left shoulder and both knees.  She took ibuprofen, symptoms resolved in 4 days and have not recurred.    She did have an inversion injury to her left ankle July 29, 2024.  She was diagnosed with 1/5 metatarsal fracture, which has healed.  Her orthopedist did permit her to travel to [place] for a wedding, but she was wearing a walking boot.  The foot has totally healed.  Last DEXA was July 2022.    She had a mild case of COVID 9/24 after traveling to [place].    She c/o numbness in feet- related to chemotherapy.     She had a tooth extracted- will get implant in 3 months.     She developed rash on her distal lower extremities 6/09/24.   She saw a nurse practitioner in her primary care office.  The nurse did not think the lesions were palpable.  The lesions were only located on her anterior distal legs, distal to the knees.  There was no associated itching or pain.  Rash resolved without treatment.  ANCA and cryoglobulins were negative 6/28, urinalysis was also normal.     She has chronic diarrhea since her Whipple procedure October 2023.  She has chronic numbness in her feet since she completed her chemotherapy February 2024.  She gets occasional loose stool. They are trying to wean creon.  She also takes omeprazole 20 mh 2x daily for gerd.    CT of abdomen/ pelvis, and chest CT were without change 6/24/24.     She haD follow-up scheduled with her oncologist and her surgeon July 2, 2024.  CT scans at the end of June 2024 showed no change.     She was diagnosed  with adenocarcinoma of the pancreas June 2023. She is followed by Dr. Davidson in oncology.  TREATMENT SUMMARY:  1-Neoadjuvant FOLFIRINOX x 3 months: completed 9/8/2023   2-Whipple procedure (Dr Clements, 10/6/2023), yT2N1  3-Adjuvant FOLFIRINOX: started 11/29/2023, completed 2/9/2024  4-Current treatment: surveillance     She continues with Restasis, refresh drops, and refresh gel at bedtime for treatment of dry eyes.  She has been off methotrexate since June 2023.     She had eye exam 6/22 (Dr. Padgett and Dr. Mak- retina). She has wrinle in left retina, 3 small drusen .  She has been getting seen every 3 months.  Follow up scheduled January 2025.     She had a colonoscopy last 10/21, was found to have 4 polyps. 3-year follow-up was recommended.  Her maternal uncle had colon cancer.  She had another colonoscopy July 30, 2024.  She did have a sessile serrated polyp which was removed.     DEXA 7/22: T score -1.2 left total hip, T score -1.2 left femoral neck, lumbar spine T score normal     Labs September 2022: Fasting glucose 104, cholesterol 170, HDL 46, , triglycerides 91  Labs October 2022: CBC normal, CMP normal except glucose 116, CRP 0.57 (normal less than 1),   Labs 1/23: CMP normal except creatinine 1.25 (GFR 48) and glucose 140, CBC normal, CRP 0.56 (less than 1)  Labs April 2023: CMP normal except glucose 127, CBC normal except hemoglobin 11.8, CRP 5.19 (normal less than 1)  Labs April 2024: CMP normal except alkaline phosphatase 534 (), AST 84 (13-35), ALT 94 (7-38), CBC normal, CA 19-9 normal, magnesium 1.9, urine microalbumin negative  Labs June 2024: ESR 8, CRP 0.25 (normal less than 1), hep B surface antigen negative, hep B surface antibody nonreactive, EMA pending, GIULIANA panel negative, C3 and C4 normal, IgA normal, urinalysis normal, CBC normal except hemoglobin 11.4 hematocrit 34.9,, CMP normal except AST 42 (normal 13-35), ALT 50 (normal 7-38), alkaline phosphatase 308 (normal 34-1 23),  "SPEP normal,  ASO titer negative, cryos negative, ANCA negative  Labs 7/24: CBC normal except hemglobin 11 and hematocrit 33.7, CMP normal except alkaline phosphatase 218, AST 44 (9-39), creatinine 1.96 (GFR 58), CRP less than 0.1 (less than 1),   Labs 10/24: CRP 0.26, CMP normal except AST 56 (13-35), ALT 44 (7-38), alkaline phosphatase 196, glucose 106, cholesterol 169, HDL 67, LDL 88, triglycerides 68, 25-hydroxy vitamin D 29.1 hemoglobin A1c 6.0,     Health maintenance:   -Prevnar 20 October 2022   -Pfizer COVID vaccine 3/21, 4/21, 8/21, 4/22   - BV Pfizer COVID booster 10/22    Review of Systems  General: Denies fevers or chills.  CV: Denies chest pain or palpitations.  Denies leg edema.  Lungs: Denies coughing or shortness of breath.  Skin: Denies rashes or nodules.  MS: Denies joint pain or joint swelling.     Objective   Visit Vitals  /82 (BP Location: Left arm, Patient Position: Sitting, BP Cuff Size: Adult)   Pulse 80   Temp 37.6 °C (99.7 °F) (Skin)   Resp 16   Ht 1.702 m (5' 7\")   Wt 63.8 kg (140 lb 9.6 oz)   SpO2 98%   BMI 22.02 kg/m²   OB Status Postmenopausal   Smoking Status Never   BSA 1.74 m²        Physical Exam  HEENT: PERRL, EOMI  Neck: Supple, no nodes.  CV: RRR, no MGR.  Lungs: Clear, no rales or wheezes.  Abdomen: Soft, nontender. No hepatosplenomegaly.  Extremities:  No cyanosis, clubbing, or edema.  MS: Swollen: 0         Tender: 0         Patient global: 1         Evaluator global: 1          CDAI: 2 (remission)            Skin: No rashes or nodules.    Assessment/Plan   Problem List Items Addressed This Visit             ICD-10-CM    Rheumatoid arthritis - Primary M06.9    BMI 22.0-22.9, adult Z68.22     Other Visit Diagnoses         Codes    Postmenopausal estrogen deficiency     Z78.0    Relevant Orders    XR DEXA bone density            Seropositive rheumatoid arthritis-onset 2006 (CCP 41, RF negative, EMA 1:160 with negative panel, CRP 1.8, ESR 32)-she was on methotrexate until " diagnosed with pancreatic cancer June 2023.   She had Whipple procedure October 2023.  She completed adjuvant chemotherapy 2/24.  She started hydroxychloroquine the first week of May 2024.  She had a brief flare which lasted 4 days about 3 weeks ago.  She has not had a recurrence since.     Rash-started June 9, 2024 on distal lower extremities.  She sent me photographs of the rash.  It did not appear to be a drug rash.  Her nurse practitioner did not think the rash was palpable, although it looks from the photograph like it could have been vasculitic.  Rash resolved without treatment.    Elevated alkaline phosphatase-alkaline phosphatase is currently 199.  It has declined from June 2024, when it was 330.  She is going to follow-up with her oncology surgeon.     BMI-currently 22 and stable.    Left fifth metatarsal fracture-occurred with inversion injury to the left ankle July 29, 2024.  DEXA ordered.     Plan:  Bone density ordered.  Follow-up in 4 months.          weight-bearing as tolerated Color consistent with ethnicity/race, warm, dry intact, resilient.

## 2025-06-06 NOTE — DISCHARGE NOTE ADULT - IF YOU ARE A SMOKER, IT IS IMPORTANT FOR YOUR HEALTH TO STOP SMOKING. PLEASE BE AWARE THAT SECOND HAND SMOKE IS ALSO HARMFUL.
Health Maintenance       Pneumococcal Vaccine 50+ (2 of 2 - PCV)  Overdue since 3/17/2017    Diabetes Foot Exam (Yearly)  Overdue since 1/10/2023    Diabetes Eye Exam (Yearly)  Overdue since 10/30/2024    Shingles Vaccine (1 of 2)  Never done    Traditional Medicare- Medicare Wellness Visit (Yearly)  Due soon on 9/1/2025    Depression Screening (Yearly)  Due soon on 9/4/2025           Following review of the above:  Patient wishes to discuss with clinician: Diabetes Eye Exam, Diabetes Foot Exam, Pneumococcal, and Shingles    Note: Refer to final orders and clinician documentation.       Statement Selected